# Patient Record
Sex: FEMALE | Race: WHITE | NOT HISPANIC OR LATINO | URBAN - METROPOLITAN AREA
[De-identification: names, ages, dates, MRNs, and addresses within clinical notes are randomized per-mention and may not be internally consistent; named-entity substitution may affect disease eponyms.]

---

## 2017-02-15 ENCOUNTER — OFFICE (AMBULATORY)
Dept: URBAN - METROPOLITAN AREA CLINIC 64 | Facility: CLINIC | Age: 57
End: 2017-02-15

## 2017-02-15 VITALS
SYSTOLIC BLOOD PRESSURE: 135 MMHG | HEIGHT: 62 IN | HEART RATE: 78 BPM | WEIGHT: 153 LBS | DIASTOLIC BLOOD PRESSURE: 86 MMHG

## 2017-02-15 DIAGNOSIS — R10.32 LEFT LOWER QUADRANT PAIN: ICD-10-CM

## 2017-02-15 PROCEDURE — 99203 OFFICE O/P NEW LOW 30 MIN: CPT | Performed by: INTERNAL MEDICINE

## 2017-03-22 ENCOUNTER — HOSPITAL ENCOUNTER (OUTPATIENT)
Dept: MAMMOGRAPHY | Facility: HOSPITAL | Age: 57
Discharge: HOME | End: 2017-03-22
Attending: INTERNAL MEDICINE | Admitting: INTERNAL MEDICINE

## 2017-03-22 ENCOUNTER — OFFICE (AMBULATORY)
Dept: URBAN - METROPOLITAN AREA CLINIC 64 | Facility: CLINIC | Age: 57
End: 2017-03-22

## 2017-03-22 VITALS
HEIGHT: 62 IN | DIASTOLIC BLOOD PRESSURE: 86 MMHG | WEIGHT: 152 LBS | SYSTOLIC BLOOD PRESSURE: 113 MMHG | HEART RATE: 99 BPM

## 2017-03-22 DIAGNOSIS — R10.32 LEFT LOWER QUADRANT PAIN: ICD-10-CM

## 2017-03-22 DIAGNOSIS — R93.3 ABNORMAL FINDINGS ON DIAGNOSTIC IMAGING OF OTHER PARTS OF DI: ICD-10-CM

## 2017-03-22 DIAGNOSIS — K57.90 DIVERTICULOSIS OF INTESTINE, PART UNSPECIFIED, WITHOUT PERFO: ICD-10-CM

## 2017-03-22 PROCEDURE — 99214 OFFICE O/P EST MOD 30 MIN: CPT | Performed by: INTERNAL MEDICINE

## 2017-04-07 ENCOUNTER — ON CAMPUS - OUTPATIENT (AMBULATORY)
Dept: URBAN - METROPOLITAN AREA HOSPITAL 2 | Facility: HOSPITAL | Age: 57
End: 2017-04-07

## 2017-04-07 ENCOUNTER — OFFICE (AMBULATORY)
Dept: URBAN - METROPOLITAN AREA CLINIC 64 | Facility: CLINIC | Age: 57
End: 2017-04-07
Payer: COMMERCIAL

## 2017-04-07 VITALS
HEART RATE: 78 BPM | WEIGHT: 149.6 LBS | HEART RATE: 105 BPM | DIASTOLIC BLOOD PRESSURE: 93 MMHG | DIASTOLIC BLOOD PRESSURE: 91 MMHG | OXYGEN SATURATION: 95 % | SYSTOLIC BLOOD PRESSURE: 122 MMHG | OXYGEN SATURATION: 79 % | HEART RATE: 96 BPM | RESPIRATION RATE: 17 BRPM | DIASTOLIC BLOOD PRESSURE: 49 MMHG | TEMPERATURE: 97.9 F | SYSTOLIC BLOOD PRESSURE: 111 MMHG | HEART RATE: 95 BPM | DIASTOLIC BLOOD PRESSURE: 86 MMHG | SYSTOLIC BLOOD PRESSURE: 137 MMHG | DIASTOLIC BLOOD PRESSURE: 81 MMHG | SYSTOLIC BLOOD PRESSURE: 149 MMHG | HEART RATE: 103 BPM | OXYGEN SATURATION: 97 % | DIASTOLIC BLOOD PRESSURE: 96 MMHG | OXYGEN SATURATION: 89 % | SYSTOLIC BLOOD PRESSURE: 135 MMHG | SYSTOLIC BLOOD PRESSURE: 113 MMHG | DIASTOLIC BLOOD PRESSURE: 98 MMHG | RESPIRATION RATE: 15 BRPM | SYSTOLIC BLOOD PRESSURE: 106 MMHG | OXYGEN SATURATION: 100 % | OXYGEN SATURATION: 98 % | RESPIRATION RATE: 18 BRPM | HEART RATE: 84 BPM | DIASTOLIC BLOOD PRESSURE: 90 MMHG | HEIGHT: 62 IN | DIASTOLIC BLOOD PRESSURE: 94 MMHG | SYSTOLIC BLOOD PRESSURE: 108 MMHG | OXYGEN SATURATION: 99 % | OXYGEN SATURATION: 87 % | HEART RATE: 102 BPM | SYSTOLIC BLOOD PRESSURE: 121 MMHG | RESPIRATION RATE: 16 BRPM | TEMPERATURE: 98.3 F | HEART RATE: 91 BPM | DIASTOLIC BLOOD PRESSURE: 59 MMHG

## 2017-04-07 DIAGNOSIS — D12.2 BENIGN NEOPLASM OF ASCENDING COLON: ICD-10-CM

## 2017-04-07 DIAGNOSIS — R93.3 ABNORMAL FINDINGS ON DIAGNOSTIC IMAGING OF OTHER PARTS OF DI: ICD-10-CM

## 2017-04-07 DIAGNOSIS — K57.30 DIVERTICULOSIS OF LARGE INTESTINE WITHOUT PERFORATION OR ABS: ICD-10-CM

## 2017-04-07 DIAGNOSIS — K64.1 SECOND DEGREE HEMORRHOIDS: ICD-10-CM

## 2017-04-07 LAB
GI HISTOLOGY: A. UNSPECIFIED: (no result)
GI HISTOLOGY: PDF REPORT: (no result)

## 2017-04-07 PROCEDURE — 88305 TISSUE EXAM BY PATHOLOGIST: CPT | Performed by: INTERNAL MEDICINE

## 2017-04-07 PROCEDURE — 45385 COLONOSCOPY W/LESION REMOVAL: CPT | Performed by: INTERNAL MEDICINE

## 2017-04-07 RX ADMIN — PROPOFOL: 10 INJECTION, EMULSION INTRAVENOUS at 16:19

## 2019-07-02 ENCOUNTER — TELEPHONE (OUTPATIENT)
Dept: FAMILY MEDICINE CLINIC | Facility: CLINIC | Age: 59
End: 2019-07-02

## 2019-07-02 DIAGNOSIS — Z00.00 PREVENTATIVE HEALTH CARE: Primary | ICD-10-CM

## 2019-07-02 DIAGNOSIS — E03.9 ACQUIRED HYPOTHYROIDISM: ICD-10-CM

## 2019-07-02 DIAGNOSIS — R03.0 ELEVATED BLOOD PRESSURE READING WITHOUT DIAGNOSIS OF HYPERTENSION: ICD-10-CM

## 2019-07-02 DIAGNOSIS — Z13.220 LIPID SCREENING: ICD-10-CM

## 2019-07-02 PROBLEM — R00.2 PALPITATIONS: Status: ACTIVE | Noted: 2018-12-12

## 2019-07-02 PROBLEM — N39.0 URINARY TRACT INFECTION: Status: ACTIVE | Noted: 2018-12-12

## 2019-07-02 PROBLEM — J30.9 ALLERGIC RHINITIS: Status: ACTIVE | Noted: 2018-12-12

## 2019-07-02 PROBLEM — Z12.31 OTHER SCREENING MAMMOGRAM: Status: ACTIVE | Noted: 2017-01-23

## 2019-07-02 RX ORDER — CHLORTHALIDONE 25 MG/1
12.5 TABLET ORAL EVERY MORNING
Qty: 45 TABLET | Refills: 0 | Status: SHIPPED | OUTPATIENT
Start: 2019-07-02 | End: 2019-12-05 | Stop reason: SDUPTHER

## 2019-07-02 RX ORDER — LEVOTHYROXINE SODIUM 0.07 MG/1
1 TABLET ORAL DAILY
Refills: 5 | COMMUNITY
Start: 2019-04-05 | End: 2019-07-02 | Stop reason: SDUPTHER

## 2019-07-02 RX ORDER — LEVOTHYROXINE SODIUM 0.07 MG/1
75 TABLET ORAL DAILY
Qty: 90 TABLET | Refills: 0 | Status: SHIPPED | OUTPATIENT
Start: 2019-07-02 | End: 2019-09-25 | Stop reason: SDUPTHER

## 2019-07-02 RX ORDER — ZOLMITRIPTAN 5 MG/1
TABLET, FILM COATED ORAL
Qty: 36 TABLET | Refills: 0 | Status: SHIPPED | OUTPATIENT
Start: 2019-07-02 | End: 2019-12-05 | Stop reason: SDUPTHER

## 2019-07-02 RX ORDER — ZOLMITRIPTAN 5 MG/1
TABLET, FILM COATED ORAL
COMMUNITY
Start: 2017-10-17 | End: 2019-07-02 | Stop reason: SDUPTHER

## 2019-07-02 RX ORDER — CHLORTHALIDONE 25 MG/1
0.5 TABLET ORAL EVERY MORNING
Refills: 1 | COMMUNITY
Start: 2019-06-16 | End: 2019-07-02 | Stop reason: SDUPTHER

## 2019-07-03 ENCOUNTER — TELEPHONE (OUTPATIENT)
Dept: FAMILY MEDICINE CLINIC | Facility: CLINIC | Age: 59
End: 2019-07-03

## 2019-07-03 LAB
ALBUMIN SERPL-MCNC: 4.1 G/DL (ref 3.5–5.5)
ALBUMIN/GLOB SERPL: 1.4 {RATIO} (ref 1.2–2.2)
ALP SERPL-CCNC: 97 IU/L (ref 39–117)
ALT SERPL-CCNC: 10 IU/L (ref 0–32)
AST SERPL-CCNC: 11 IU/L (ref 0–40)
BILIRUB SERPL-MCNC: 0.3 MG/DL (ref 0–1.2)
BUN SERPL-MCNC: 8 MG/DL (ref 6–24)
BUN/CREAT SERPL: 11 (ref 9–23)
CALCIUM SERPL-MCNC: 9.7 MG/DL (ref 8.7–10.2)
CHLORIDE SERPL-SCNC: 100 MMOL/L (ref 96–106)
CHOLEST SERPL-MCNC: 162 MG/DL (ref 100–199)
CO2 SERPL-SCNC: 28 MMOL/L (ref 20–29)
CREAT SERPL-MCNC: 0.71 MG/DL (ref 0.57–1)
ERYTHROCYTE [DISTWIDTH] IN BLOOD BY AUTOMATED COUNT: 13 % (ref 12.3–15.4)
GLOBULIN SER CALC-MCNC: 3 G/DL (ref 1.5–4.5)
GLUCOSE SERPL-MCNC: 95 MG/DL (ref 65–99)
HCT VFR BLD AUTO: 45.1 % (ref 34–46.6)
HDLC SERPL-MCNC: 64 MG/DL
HGB BLD-MCNC: 14.3 G/DL (ref 11.1–15.9)
LDLC SERPL CALC-MCNC: 82 MG/DL (ref 0–99)
MCH RBC QN AUTO: 29.7 PG (ref 26.6–33)
MCHC RBC AUTO-ENTMCNC: 31.7 G/DL (ref 31.5–35.7)
MCV RBC AUTO: 94 FL (ref 79–97)
PLATELET # BLD AUTO: 335 X10E3/UL (ref 150–450)
POTASSIUM SERPL-SCNC: 4.1 MMOL/L (ref 3.5–5.2)
PROT SERPL-MCNC: 7.1 G/DL (ref 6–8.5)
RBC # BLD AUTO: 4.81 X10E6/UL (ref 3.77–5.28)
SODIUM SERPL-SCNC: 141 MMOL/L (ref 134–144)
T3 SERPL-MCNC: 121 NG/DL (ref 71–180)
T4 FREE SERPL-MCNC: 1.54 NG/DL (ref 0.82–1.77)
TRIGL SERPL-MCNC: 80 MG/DL (ref 0–149)
TSH SERPL DL<=0.005 MIU/L-ACNC: 2.4 UIU/ML (ref 0.45–4.5)
VLDLC SERPL CALC-MCNC: 16 MG/DL (ref 5–40)
WBC # BLD AUTO: 7.3 X10E3/UL (ref 3.4–10.8)

## 2019-09-25 RX ORDER — LEVOTHYROXINE SODIUM 0.07 MG/1
75 TABLET ORAL DAILY
Qty: 90 TABLET | Refills: 0 | Status: SHIPPED | OUTPATIENT
Start: 2019-09-25 | End: 2019-12-05 | Stop reason: SDUPTHER

## 2019-10-17 RX ORDER — ESTRADIOL 1 MG/1
1 TABLET ORAL DAILY
Qty: 30 TABLET | Refills: 1 | Status: SHIPPED | OUTPATIENT
Start: 2019-10-17 | End: 2019-10-31 | Stop reason: SDUPTHER

## 2019-10-17 RX ORDER — ESTRADIOL 1 MG/1
1 TABLET ORAL DAILY
COMMUNITY
Start: 2017-08-25 | End: 2019-10-17 | Stop reason: SDUPTHER

## 2019-10-31 RX ORDER — ESTRADIOL 1 MG/1
1 TABLET ORAL DAILY
Qty: 90 TABLET | Refills: 0 | Status: SHIPPED | OUTPATIENT
Start: 2019-10-31 | End: 2019-12-05 | Stop reason: SDUPTHER

## 2019-11-23 RX ORDER — CHLORTHALIDONE 25 MG/1
TABLET ORAL
Qty: 45 TABLET | Refills: 0 | OUTPATIENT
Start: 2019-11-23

## 2019-12-05 ENCOUNTER — OFFICE VISIT (OUTPATIENT)
Dept: FAMILY MEDICINE CLINIC | Facility: CLINIC | Age: 59
End: 2019-12-05

## 2019-12-05 VITALS
OXYGEN SATURATION: 99 % | WEIGHT: 146.6 LBS | DIASTOLIC BLOOD PRESSURE: 84 MMHG | SYSTOLIC BLOOD PRESSURE: 126 MMHG | HEIGHT: 62 IN | BODY MASS INDEX: 26.98 KG/M2 | HEART RATE: 65 BPM | TEMPERATURE: 97.6 F

## 2019-12-05 DIAGNOSIS — L81.9 DISCOLORATION OF SKIN OF FOOT: ICD-10-CM

## 2019-12-05 DIAGNOSIS — M79.89 SWELLING OF EXTREMITY: ICD-10-CM

## 2019-12-05 DIAGNOSIS — E03.9 ACQUIRED HYPOTHYROIDISM: Primary | ICD-10-CM

## 2019-12-05 DIAGNOSIS — IMO0002 BODY MASS INDEX (BMI) OF 25.0 TO 29.9: ICD-10-CM

## 2019-12-05 DIAGNOSIS — Z00.00 PREVENTATIVE HEALTH CARE: ICD-10-CM

## 2019-12-05 DIAGNOSIS — Z13.220 LIPID SCREENING: ICD-10-CM

## 2019-12-05 DIAGNOSIS — I99.9 CIRCULATION PROBLEM: ICD-10-CM

## 2019-12-05 DIAGNOSIS — E53.8 VITAMIN B 12 DEFICIENCY: ICD-10-CM

## 2019-12-05 DIAGNOSIS — M79.89 LEG SWELLING: ICD-10-CM

## 2019-12-05 PROBLEM — M17.11 OSTEOARTHRITIS OF RIGHT KNEE: Status: ACTIVE | Noted: 2019-12-05

## 2019-12-05 PROBLEM — G25.81 RLS (RESTLESS LEGS SYNDROME): Status: ACTIVE | Noted: 2019-12-05

## 2019-12-05 PROBLEM — J30.89 PERENNIAL ALLERGIC RHINITIS WITH SEASONAL VARIATION: Status: ACTIVE | Noted: 2019-01-10

## 2019-12-05 PROBLEM — H65.90 SEROUS OTITIS MEDIA: Status: ACTIVE | Noted: 2017-02-16

## 2019-12-05 PROBLEM — H61.21 IMPACTED CERUMEN OF RIGHT EAR: Status: ACTIVE | Noted: 2017-02-19

## 2019-12-05 PROBLEM — J32.9 CHRONIC SINUSITIS: Status: ACTIVE | Noted: 2017-02-16

## 2019-12-05 PROBLEM — J30.2 PERENNIAL ALLERGIC RHINITIS WITH SEASONAL VARIATION: Status: ACTIVE | Noted: 2019-01-10

## 2019-12-05 PROBLEM — R12 HEART BURN: Status: ACTIVE | Noted: 2019-12-05

## 2019-12-05 PROBLEM — H93.13 BILATERAL TINNITUS: Status: ACTIVE | Noted: 2019-02-06

## 2019-12-05 PROCEDURE — 99214 OFFICE O/P EST MOD 30 MIN: CPT | Performed by: NURSE PRACTITIONER

## 2019-12-05 RX ORDER — ESTRADIOL 1 MG/1
1 TABLET ORAL DAILY
Qty: 90 TABLET | Refills: 0 | Status: SHIPPED | OUTPATIENT
Start: 2019-12-05 | End: 2020-03-03

## 2019-12-05 RX ORDER — CHLORTHALIDONE 25 MG/1
12.5 TABLET ORAL EVERY MORNING
Qty: 45 TABLET | Refills: 0 | Status: SHIPPED | OUTPATIENT
Start: 2019-12-05 | End: 2020-03-02

## 2019-12-05 RX ORDER — LEVOTHYROXINE SODIUM 0.07 MG/1
75 TABLET ORAL DAILY
Qty: 90 TABLET | Refills: 0 | Status: SHIPPED | OUTPATIENT
Start: 2019-12-05 | End: 2020-03-02

## 2019-12-05 RX ORDER — ZOLMITRIPTAN 5 MG/1
TABLET, FILM COATED ORAL
Qty: 36 TABLET | Refills: 0 | Status: SHIPPED | OUTPATIENT
Start: 2019-12-05 | End: 2020-03-23

## 2019-12-05 NOTE — PATIENT INSTRUCTIONS
Fasting blood work today   bilateral venous Doppler study   get vascular studies done   schedule eye exam

## 2019-12-05 NOTE — PROGRESS NOTES
Subjective   Marion Ro is a 59 y.o. female.      59-year-old white female with history of hypothyroidism, arthritis, hysterectomy who comes in today for follow-up visit and fasting blood work.  Patient's only complaint is that in the past year her feet have been hurting reoccurring blue especially when sitting and she has had some pain in her legs with standing.  She does have some mild Crohn's veins in her legs and I am getting a venous Doppler study.  I also gave her information to get vascular studies done  Open blood pressure 126/84 heart rate 64 with murmur she denies any chest pain, dyspnea, tachycardia, dizziness or edema  Weight is  Down 8 lb at 147   her colonoscopy is due in 2020 she is getting ready to schedule an eye exam and she is up-to-date on mammograms and flu shots     fasting blood work   schedule eye exam   venous Doppler studies bilaterally   preventative vascular studies         The following portions of the patient's history were reviewed and updated as appropriate: allergies, current medications, past family history, past medical history, past social history, past surgical history and problem list.    Review of Systems   Constitutional: Negative.    HENT: Negative.    Respiratory: Negative.    Cardiovascular: Negative.         Feet turning red and blue and leg pain with standing   Gastrointestinal: Negative.    Genitourinary: Negative.    Musculoskeletal: Negative.    Skin: Negative.    Neurological: Negative.    Psychiatric/Behavioral: Negative.        Objective   Physical Exam   Constitutional: She appears well-developed and well-nourished.   Cardiovascular: Normal rate and regular rhythm.   Pulmonary/Chest: Effort normal and breath sounds normal.   Abdominal: Soft. Bowel sounds are normal.   Musculoskeletal: Normal range of motion.   Skin: Skin is dry.   Feet turning red and blue and becoming cold when patient sits with right foot being worsen lab in legs hurt when she is standing    Psychiatric: She has a normal mood and affect.         Assessment/Plan   Marion was seen today for hypothyroidism.    Diagnoses and all orders for this visit:    Acquired hypothyroidism  -     TSH+Free T4  -     T3    Lipid screening    Preventative health care  -     CBC & Differential  -     Comprehensive Metabolic Panel  -     Lipid Panel With LDL / HDL Ratio    Vitamin B 12 deficiency  -     Vitamin B12    Discoloration of skin of foot  -     Cancel: Duplex Venous Lower Extremity - Bilateral CAR; Future  -     Duplex Venous Lower Extremity - Bilateral CAR; Future    Circulation problem  -     Duplex Venous Lower Extremity - Bilateral CAR; Future    Leg swelling  -     Duplex Venous Lower Extremity - Bilateral CAR; Future    Body mass index (BMI) of 25.0 to 29.9    Swelling of extremity    Other orders  -     ZOLMitriptan (ZOMIG) 5 MG tablet; Take 1 tablet by mouth at the onset of headache.  May repeat in 2 hrs if needed.  Max of 2 per day.  -     levothyroxine (SYNTHROID, LEVOTHROID) 75 MCG tablet; Take 1 tablet by mouth Daily.  -     estradiol (ESTRACE) 1 MG tablet; Take 1 tablet by mouth Daily.  -     chlorthalidone (HYGROTON) 25 MG tablet; Take 0.5 tablets by mouth Every Morning.

## 2019-12-06 LAB
ALBUMIN SERPL-MCNC: 4.2 G/DL (ref 3.5–5.5)
ALBUMIN/GLOB SERPL: 1.4 {RATIO} (ref 1.2–2.2)
ALP SERPL-CCNC: 96 IU/L (ref 39–117)
ALT SERPL-CCNC: 15 IU/L (ref 0–32)
AST SERPL-CCNC: 13 IU/L (ref 0–40)
BASOPHILS # BLD AUTO: 0 X10E3/UL (ref 0–0.2)
BASOPHILS NFR BLD AUTO: 1 %
BILIRUB SERPL-MCNC: 0.2 MG/DL (ref 0–1.2)
BUN SERPL-MCNC: 11 MG/DL (ref 6–24)
BUN/CREAT SERPL: 16 (ref 9–23)
CALCIUM SERPL-MCNC: 9.2 MG/DL (ref 8.7–10.2)
CHLORIDE SERPL-SCNC: 99 MMOL/L (ref 96–106)
CHOLEST SERPL-MCNC: 162 MG/DL (ref 100–199)
CO2 SERPL-SCNC: 24 MMOL/L (ref 20–29)
CREAT SERPL-MCNC: 0.69 MG/DL (ref 0.57–1)
EOSINOPHIL # BLD AUTO: 0.1 X10E3/UL (ref 0–0.4)
EOSINOPHIL NFR BLD AUTO: 2 %
ERYTHROCYTE [DISTWIDTH] IN BLOOD BY AUTOMATED COUNT: 12.5 % (ref 12.3–15.4)
GLOBULIN SER CALC-MCNC: 3 G/DL (ref 1.5–4.5)
GLUCOSE SERPL-MCNC: 85 MG/DL (ref 65–99)
HCT VFR BLD AUTO: 43.9 % (ref 34–46.6)
HDLC SERPL-MCNC: 62 MG/DL
HGB BLD-MCNC: 14.1 G/DL (ref 11.1–15.9)
IMM GRANULOCYTES # BLD AUTO: 0 X10E3/UL (ref 0–0.1)
IMM GRANULOCYTES NFR BLD AUTO: 0 %
LDLC SERPL CALC-MCNC: 83 MG/DL (ref 0–99)
LDLC/HDLC SERPL: 1.3 RATIO (ref 0–3.2)
LYMPHOCYTES # BLD AUTO: 1.5 X10E3/UL (ref 0.7–3.1)
LYMPHOCYTES NFR BLD AUTO: 19 %
MCH RBC QN AUTO: 29.9 PG (ref 26.6–33)
MCHC RBC AUTO-ENTMCNC: 32.1 G/DL (ref 31.5–35.7)
MCV RBC AUTO: 93 FL (ref 79–97)
MONOCYTES # BLD AUTO: 0.7 X10E3/UL (ref 0.1–0.9)
MONOCYTES NFR BLD AUTO: 9 %
NEUTROPHILS # BLD AUTO: 5.5 X10E3/UL (ref 1.4–7)
NEUTROPHILS NFR BLD AUTO: 69 %
PLATELET # BLD AUTO: 330 X10E3/UL (ref 150–450)
POTASSIUM SERPL-SCNC: 4 MMOL/L (ref 3.5–5.2)
PROT SERPL-MCNC: 7.2 G/DL (ref 6–8.5)
RBC # BLD AUTO: 4.71 X10E6/UL (ref 3.77–5.28)
SODIUM SERPL-SCNC: 139 MMOL/L (ref 134–144)
T3 SERPL-MCNC: 125 NG/DL (ref 71–180)
T4 FREE SERPL-MCNC: 1.41 NG/DL (ref 0.82–1.77)
TRIGL SERPL-MCNC: 84 MG/DL (ref 0–149)
TSH SERPL DL<=0.005 MIU/L-ACNC: 2.43 UIU/ML (ref 0.45–4.5)
VIT B12 SERPL-MCNC: 1669 PG/ML (ref 232–1245)
VLDLC SERPL CALC-MCNC: 17 MG/DL (ref 5–40)
WBC # BLD AUTO: 7.9 X10E3/UL (ref 3.4–10.8)

## 2019-12-11 ENCOUNTER — TRANSCRIBE ORDERS (OUTPATIENT)
Dept: ADMINISTRATIVE | Facility: HOSPITAL | Age: 59
End: 2019-12-11

## 2019-12-11 DIAGNOSIS — Z13.6 ENCOUNTER FOR SCREENING FOR VASCULAR DISEASE: Primary | ICD-10-CM

## 2019-12-18 DIAGNOSIS — M79.89 LEG SWELLING: ICD-10-CM

## 2019-12-18 DIAGNOSIS — I99.9 CIRCULATION PROBLEM: ICD-10-CM

## 2019-12-18 DIAGNOSIS — L81.9 DISCOLORATION OF SKIN OF FOOT: ICD-10-CM

## 2019-12-27 ENCOUNTER — HOSPITAL ENCOUNTER (OUTPATIENT)
Dept: CARDIOLOGY | Facility: HOSPITAL | Age: 59
Discharge: HOME | End: 2019-12-27
Admitting: NURSE PRACTITIONER

## 2019-12-27 DIAGNOSIS — Z13.6 ENCOUNTER FOR SCREENING FOR VASCULAR DISEASE: ICD-10-CM

## 2019-12-27 LAB
BH CV XLRA MEAS - MID AO DIAM: 1.46 CM
BH CV XLRA MEAS - PAD LEFT ABI PT: 1.2
BH CV XLRA MEAS - PAD LEFT ARM: 127 MMHG
BH CV XLRA MEAS - PAD LEFT LEG PT: 153 MMHG
BH CV XLRA MEAS - PAD RIGHT ABI PT: 1.27
BH CV XLRA MEAS - PAD RIGHT ARM: 128 MMHG
BH CV XLRA MEAS - PAD RIGHT LEG PT: 163 MMHG
BH CV XLRA MEAS LEFT CCA RATIO VEL: -76.2 CM/SEC
BH CV XLRA MEAS LEFT ICA RATIO VEL: -78.6 CM/SEC
BH CV XLRA MEAS LEFT ICA/CCA RATIO: 1
BH CV XLRA MEAS RIGHT CCA RATIO VEL: -97.5 CM/SEC
BH CV XLRA MEAS RIGHT ICA RATIO VEL: -84.5 CM/SEC
BH CV XLRA MEAS RIGHT ICA/CCA RATIO: 0.87

## 2019-12-27 PROCEDURE — 93799 UNLISTED CV SVC/PROCEDURE: CPT

## 2020-02-04 ENCOUNTER — TELEPHONE (OUTPATIENT)
Dept: FAMILY MEDICINE CLINIC | Facility: CLINIC | Age: 60
End: 2020-02-04

## 2020-02-04 RX ORDER — OSELTAMIVIR PHOSPHATE 75 MG/1
75 CAPSULE ORAL 2 TIMES DAILY
Qty: 10 CAPSULE | Refills: 0 | Status: SHIPPED | OUTPATIENT
Start: 2020-02-04 | End: 2020-05-29

## 2020-02-04 NOTE — TELEPHONE ENCOUNTER
Pt called and said her grandson just tested positve for Flu A and wants to know if you could call her in Tamiflu.  SG

## 2020-02-27 ENCOUNTER — APPOINTMENT (OUTPATIENT)
Dept: CARDIOLOGY | Facility: HOSPITAL | Age: 60
End: 2020-02-27

## 2020-03-02 RX ORDER — LEVOTHYROXINE SODIUM 0.07 MG/1
TABLET ORAL
Qty: 90 TABLET | Refills: 0 | Status: SHIPPED | OUTPATIENT
Start: 2020-03-02 | End: 2020-05-28 | Stop reason: SDUPTHER

## 2020-03-02 RX ORDER — CHLORTHALIDONE 25 MG/1
TABLET ORAL
Qty: 45 TABLET | Refills: 0 | Status: SHIPPED | OUTPATIENT
Start: 2020-03-02 | End: 2020-05-28 | Stop reason: SDUPTHER

## 2020-03-03 RX ORDER — ESTRADIOL 1 MG/1
TABLET ORAL
Qty: 90 TABLET | Refills: 0 | Status: SHIPPED | OUTPATIENT
Start: 2020-03-03 | End: 2020-05-29 | Stop reason: SDUPTHER

## 2020-03-23 RX ORDER — ZOLMITRIPTAN 5 MG/1
TABLET, FILM COATED ORAL
Qty: 36 TABLET | Refills: 0 | Status: SHIPPED | OUTPATIENT
Start: 2020-03-23 | End: 2020-09-08

## 2020-05-26 RX ORDER — LEVOTHYROXINE SODIUM 0.07 MG/1
TABLET ORAL
Qty: 90 TABLET | Refills: 0 | OUTPATIENT
Start: 2020-05-26

## 2020-05-26 RX ORDER — CHLORTHALIDONE 25 MG/1
TABLET ORAL
Qty: 45 TABLET | Refills: 0 | OUTPATIENT
Start: 2020-05-26

## 2020-05-28 RX ORDER — LEVOTHYROXINE SODIUM 0.07 MG/1
75 TABLET ORAL DAILY
Qty: 90 TABLET | Refills: 0 | Status: SHIPPED | OUTPATIENT
Start: 2020-05-28 | End: 2020-08-25

## 2020-05-28 RX ORDER — CHLORTHALIDONE 25 MG/1
12.5 TABLET ORAL EVERY MORNING
Qty: 45 TABLET | Refills: 0 | Status: SHIPPED | OUTPATIENT
Start: 2020-05-28 | End: 2020-05-29 | Stop reason: SDUPTHER

## 2020-05-29 ENCOUNTER — OFFICE VISIT (OUTPATIENT)
Dept: FAMILY MEDICINE CLINIC | Facility: CLINIC | Age: 60
End: 2020-05-29

## 2020-05-29 VITALS
OXYGEN SATURATION: 99 % | HEIGHT: 62 IN | TEMPERATURE: 97.3 F | SYSTOLIC BLOOD PRESSURE: 139 MMHG | DIASTOLIC BLOOD PRESSURE: 86 MMHG | HEART RATE: 75 BPM | WEIGHT: 143 LBS | BODY MASS INDEX: 26.31 KG/M2

## 2020-05-29 DIAGNOSIS — Z13.220 LIPID SCREENING: ICD-10-CM

## 2020-05-29 DIAGNOSIS — E03.9 ACQUIRED HYPOTHYROIDISM: Primary | ICD-10-CM

## 2020-05-29 DIAGNOSIS — Z00.00 PREVENTATIVE HEALTH CARE: ICD-10-CM

## 2020-05-29 DIAGNOSIS — R03.0 ELEVATED BLOOD PRESSURE READING WITHOUT DIAGNOSIS OF HYPERTENSION: ICD-10-CM

## 2020-05-29 DIAGNOSIS — IMO0002 BODY MASS INDEX (BMI) OF 25.0 TO 29.9: ICD-10-CM

## 2020-05-29 PROCEDURE — 99213 OFFICE O/P EST LOW 20 MIN: CPT | Performed by: NURSE PRACTITIONER

## 2020-05-29 RX ORDER — CHLORTHALIDONE 25 MG/1
12.5 TABLET ORAL EVERY MORNING
Qty: 45 TABLET | Refills: 0 | Status: SHIPPED | OUTPATIENT
Start: 2020-05-29 | End: 2020-09-08

## 2020-05-29 RX ORDER — ESTRADIOL 1 MG/1
1 TABLET ORAL DAILY
Qty: 90 TABLET | Refills: 0 | Status: SHIPPED | OUTPATIENT
Start: 2020-05-29 | End: 2020-09-08

## 2020-05-29 NOTE — PROGRESS NOTES
"    Marion Ro is a 59 y.o. female.     59-year-old white female with history of hypothyroidism, arthritis, hysterectomy who comes in today for follow-up visit and fasting blood work.  Patient has actually been doing quite well has no new complaints  Blood pressure 138/86 heart rate 74 she denies any chest pain, dyspnea, tachycardia or dizziness  Weight is down 4 pounds at 143  Patient's colonoscopy is due and she wants to schedule it herself as well as an eye exam.  The last mammogram shows 2017 but she is pretty sure she had one in January 2020 and she is going to check and find out    Fasting blood work  Schedule colonoscopy and eye exam  Find out about last mammogram       The following portions of the patient's history were reviewed and updated as appropriate: allergies, current medications, past family history, past medical history, past social history, past surgical history and problem list.    Vitals:    05/29/20 0845   BP: 139/86   BP Location: Right arm   Patient Position: Sitting   Cuff Size: Adult   Pulse: 75   Temp: 97.3 °F (36.3 °C)   TempSrc: Temporal   SpO2: 99%   Weight: 64.9 kg (143 lb)   Height: 157.5 cm (62\")     Body mass index is 26.16 kg/m².    Past Medical History:   Diagnosis Date   • Hypothyroidism      Past Surgical History:   Procedure Laterality Date   • BILATERAL BREAST REDUCTION     • BONY PELVIS SURGERY     • HYSTERECTOMY     • KNEE ARTHROPLASTY, PARTIAL REPLACEMENT Right    • THYROID CYST EXCISION       Family History   Problem Relation Age of Onset   • Lung cancer Mother      Immunization History   Administered Date(s) Administered   • Influenza, Unspecified 10/05/2019       Hospital Outpatient Visit on 12/27/2019   Component Date Value Ref Range Status   • CCA ratio freedom 12/27/2019 -76.2  cm/sec Final   • CCA ratio freedom 12/27/2019 -97.5  cm/sec Final   • ICA ratio freedom 12/27/2019 -78.6  cm/sec Final   • ICA ratio freedom 12/27/2019 -84.5  cm/sec Final   • ICA/CCA ratio 12/27/2019 " 1.0   Final   • ICA/CCA ratio 12/27/2019 0.87   Final   • Mid Ao Diam 12/27/2019 1.46  cm Final   • PAD Right Arm 12/27/2019 128  mmHg Final   • PAD Right Leg PT 12/27/2019 163  mmHg Final   • PAD Right ALIRIO PT 12/27/2019 1.27   Final   • PAD Left Arm 12/27/2019 127  mmHg Final   • PAD Left Leg PT 12/27/2019 153  mmHg Final   • PAD Left ALIRIO PT 12/27/2019 1.20   Final         Review of Systems   Constitutional: Negative.    HENT: Negative.    Respiratory: Negative.    Cardiovascular: Negative.    Gastrointestinal: Negative.    Genitourinary: Negative.    Musculoskeletal: Negative.    Skin: Negative.    Neurological: Negative.    Psychiatric/Behavioral: Negative.        Objective   Physical Exam   Constitutional: She appears well-developed and well-nourished.   Cardiovascular: Normal rate and regular rhythm.   Pulmonary/Chest: Effort normal and breath sounds normal.   Abdominal: Bowel sounds are normal.   Musculoskeletal: Normal range of motion.   Neurological: She is alert.   Skin: Skin is warm.   Psychiatric: She has a normal mood and affect.       Procedures    Assessment/Plan   Marion was seen today for hypothyroidism.    Diagnoses and all orders for this visit:    Acquired hypothyroidism  -     T3  -     TSH+Free T4    Lipid screening  -     Lipid Panel With LDL / HDL Ratio    Preventative health care  -     Comprehensive Metabolic Panel  -     CBC & Differential    Elevated blood pressure reading without diagnosis of hypertension    Body mass index (BMI) of 25.0 to 29.9    Other orders  -     chlorthalidone (HYGROTON) 25 MG tablet; Take 0.5 tablets by mouth Every Morning.  -     estradiol (ESTRACE) 1 MG tablet; Take 1 tablet by mouth Daily.          Current Outpatient Medications:   •  chlorthalidone (HYGROTON) 25 MG tablet, Take 0.5 tablets by mouth Every Morning., Disp: 45 tablet, Rfl: 0  •  estradiol (ESTRACE) 1 MG tablet, Take 1 tablet by mouth Daily., Disp: 90 tablet, Rfl: 0  •  levothyroxine (SYNTHROID,  LEVOTHROID) 75 MCG tablet, Take 1 tablet by mouth Daily., Disp: 90 tablet, Rfl: 0  •  ZOLMitriptan (ZOMIG) 5 MG tablet, TAKE 1 TABLET BY MOUTH AT THE ONSET OF HEADACHE. MAY REPEAT IN 2 HRS IF NEEDED. MAX OF 2 PER DAY., Disp: 36 tablet, Rfl: 0

## 2020-05-30 LAB
ALBUMIN SERPL-MCNC: 4.1 G/DL (ref 3.8–4.9)
ALBUMIN/GLOB SERPL: 1.6 {RATIO} (ref 1.2–2.2)
ALP SERPL-CCNC: 80 IU/L (ref 39–117)
ALT SERPL-CCNC: 10 IU/L (ref 0–32)
AST SERPL-CCNC: 15 IU/L (ref 0–40)
BASOPHILS # BLD AUTO: 0 X10E3/UL (ref 0–0.2)
BASOPHILS NFR BLD AUTO: 1 %
BILIRUB SERPL-MCNC: 0.3 MG/DL (ref 0–1.2)
BUN SERPL-MCNC: 10 MG/DL (ref 6–24)
BUN/CREAT SERPL: 14 (ref 9–23)
CALCIUM SERPL-MCNC: 9.3 MG/DL (ref 8.7–10.2)
CHLORIDE SERPL-SCNC: 101 MMOL/L (ref 96–106)
CHOLEST SERPL-MCNC: 156 MG/DL (ref 100–199)
CO2 SERPL-SCNC: 27 MMOL/L (ref 20–29)
CREAT SERPL-MCNC: 0.7 MG/DL (ref 0.57–1)
EOSINOPHIL # BLD AUTO: 0.1 X10E3/UL (ref 0–0.4)
EOSINOPHIL NFR BLD AUTO: 2 %
ERYTHROCYTE [DISTWIDTH] IN BLOOD BY AUTOMATED COUNT: 12.3 % (ref 11.7–15.4)
GLOBULIN SER CALC-MCNC: 2.6 G/DL (ref 1.5–4.5)
GLUCOSE SERPL-MCNC: 90 MG/DL (ref 65–99)
HCT VFR BLD AUTO: 43.4 % (ref 34–46.6)
HDLC SERPL-MCNC: 57 MG/DL
HGB BLD-MCNC: 14.1 G/DL (ref 11.1–15.9)
IMM GRANULOCYTES # BLD AUTO: 0 X10E3/UL (ref 0–0.1)
IMM GRANULOCYTES NFR BLD AUTO: 0 %
LDLC SERPL CALC-MCNC: 82 MG/DL (ref 0–99)
LDLC/HDLC SERPL: 1.4 RATIO (ref 0–3.2)
LYMPHOCYTES # BLD AUTO: 1.2 X10E3/UL (ref 0.7–3.1)
LYMPHOCYTES NFR BLD AUTO: 21 %
MCH RBC QN AUTO: 30.4 PG (ref 26.6–33)
MCHC RBC AUTO-ENTMCNC: 32.5 G/DL (ref 31.5–35.7)
MCV RBC AUTO: 94 FL (ref 79–97)
MONOCYTES # BLD AUTO: 0.6 X10E3/UL (ref 0.1–0.9)
MONOCYTES NFR BLD AUTO: 11 %
NEUTROPHILS # BLD AUTO: 3.9 X10E3/UL (ref 1.4–7)
NEUTROPHILS NFR BLD AUTO: 65 %
PLATELET # BLD AUTO: 296 X10E3/UL (ref 150–450)
POTASSIUM SERPL-SCNC: 3.7 MMOL/L (ref 3.5–5.2)
PROT SERPL-MCNC: 6.7 G/DL (ref 6–8.5)
RBC # BLD AUTO: 4.64 X10E6/UL (ref 3.77–5.28)
SODIUM SERPL-SCNC: 142 MMOL/L (ref 134–144)
T3 SERPL-MCNC: 108 NG/DL (ref 71–180)
T4 FREE SERPL-MCNC: 1.29 NG/DL (ref 0.82–1.77)
TRIGL SERPL-MCNC: 87 MG/DL (ref 0–149)
TSH SERPL DL<=0.005 MIU/L-ACNC: 2.51 UIU/ML (ref 0.45–4.5)
VLDLC SERPL CALC-MCNC: 17 MG/DL (ref 5–40)
WBC # BLD AUTO: 6 X10E3/UL (ref 3.4–10.8)

## 2020-06-02 DIAGNOSIS — Z12.31 OTHER SCREENING MAMMOGRAM: Primary | ICD-10-CM

## 2020-08-25 RX ORDER — LEVOTHYROXINE SODIUM 0.07 MG/1
TABLET ORAL
Qty: 90 TABLET | Refills: 0 | Status: SHIPPED | OUTPATIENT
Start: 2020-08-25 | End: 2020-09-08

## 2020-09-08 RX ORDER — CHLORTHALIDONE 25 MG/1
TABLET ORAL
Qty: 45 TABLET | Refills: 0 | Status: SHIPPED | OUTPATIENT
Start: 2020-09-08 | End: 2020-12-21

## 2020-09-08 RX ORDER — ESTRADIOL 1 MG/1
TABLET ORAL
Qty: 90 TABLET | Refills: 0 | Status: SHIPPED | OUTPATIENT
Start: 2020-09-08 | End: 2020-12-19

## 2020-09-08 RX ORDER — LEVOTHYROXINE SODIUM 0.07 MG/1
TABLET ORAL
Qty: 90 TABLET | Refills: 0 | Status: SHIPPED | OUTPATIENT
Start: 2020-09-08 | End: 2021-03-16

## 2020-09-08 RX ORDER — ZOLMITRIPTAN 5 MG/1
TABLET, FILM COATED ORAL
Qty: 36 TABLET | Refills: 0 | Status: SHIPPED | OUTPATIENT
Start: 2020-09-08 | End: 2020-12-02 | Stop reason: SDUPTHER

## 2020-11-10 DIAGNOSIS — Z12.31 OTHER SCREENING MAMMOGRAM: ICD-10-CM

## 2020-11-19 DIAGNOSIS — R92.8 ABNORMAL MAMMOGRAM OF LEFT BREAST: Primary | ICD-10-CM

## 2020-11-30 DIAGNOSIS — R92.8 ABNORMAL MAMMOGRAM OF LEFT BREAST: ICD-10-CM

## 2020-12-02 DIAGNOSIS — R92.8 ABNORMAL MAMMOGRAM OF LEFT BREAST: Primary | ICD-10-CM

## 2020-12-02 DIAGNOSIS — N63.20 MASS OF LEFT BREAST: ICD-10-CM

## 2020-12-02 RX ORDER — ZOLMITRIPTAN 5 MG/1
5 TABLET, FILM COATED ORAL ONCE AS NEEDED
Qty: 36 TABLET | Refills: 1 | Status: SHIPPED | OUTPATIENT
Start: 2020-12-02 | End: 2021-05-05 | Stop reason: SDUPTHER

## 2020-12-10 ENCOUNTER — LAB REQUISITION (OUTPATIENT)
Dept: LAB | Facility: HOSPITAL | Age: 60
End: 2020-12-10

## 2020-12-10 DIAGNOSIS — R92.8 ABNORMAL MAMMOGRAM OF LEFT BREAST: ICD-10-CM

## 2020-12-10 DIAGNOSIS — Z00.00 ENCOUNTER FOR GENERAL ADULT MEDICAL EXAMINATION WITHOUT ABNORMAL FINDINGS: ICD-10-CM

## 2020-12-10 DIAGNOSIS — N63.20 MASS OF LEFT BREAST: ICD-10-CM

## 2020-12-10 PROCEDURE — 88305 TISSUE EXAM BY PATHOLOGIST: CPT | Performed by: RADIOLOGY

## 2020-12-11 LAB
LAB AP CASE REPORT: NORMAL
PATH REPORT.FINAL DX SPEC: NORMAL
PATH REPORT.GROSS SPEC: NORMAL

## 2020-12-19 RX ORDER — ESTRADIOL 1 MG/1
TABLET ORAL
Qty: 90 TABLET | Refills: 0 | Status: SHIPPED | OUTPATIENT
Start: 2020-12-19 | End: 2021-03-04

## 2020-12-21 RX ORDER — CHLORTHALIDONE 25 MG/1
TABLET ORAL
Qty: 15 TABLET | Refills: 0 | Status: SHIPPED | OUTPATIENT
Start: 2020-12-21 | End: 2021-03-29

## 2021-03-04 RX ORDER — ESTRADIOL 1 MG/1
TABLET ORAL
Qty: 30 TABLET | Refills: 0 | Status: SHIPPED | OUTPATIENT
Start: 2021-03-04 | End: 2021-03-21

## 2021-03-16 RX ORDER — LEVOTHYROXINE SODIUM 0.07 MG/1
75 TABLET ORAL DAILY
Qty: 30 TABLET | Refills: 0 | Status: SHIPPED | OUTPATIENT
Start: 2021-03-16 | End: 2021-04-07

## 2021-03-21 RX ORDER — ESTRADIOL 1 MG/1
TABLET ORAL
Qty: 30 TABLET | Refills: 0 | Status: SHIPPED | OUTPATIENT
Start: 2021-03-21 | End: 2021-03-23

## 2021-03-21 RX ORDER — ESTRADIOL 1 MG/1
TABLET ORAL
Qty: 90 TABLET | Refills: 0 | Status: SHIPPED | OUTPATIENT
Start: 2021-03-21 | End: 2021-03-21 | Stop reason: SDUPTHER

## 2021-03-23 RX ORDER — ESTRADIOL 1 MG/1
TABLET ORAL
Qty: 30 TABLET | Refills: 0 | Status: SHIPPED | OUTPATIENT
Start: 2021-03-23 | End: 2021-05-05 | Stop reason: SDUPTHER

## 2021-03-29 RX ORDER — CHLORTHALIDONE 25 MG/1
12.5 TABLET ORAL EVERY MORNING
Qty: 15 TABLET | Refills: 0 | Status: SHIPPED | OUTPATIENT
Start: 2021-03-29 | End: 2021-04-30 | Stop reason: SDUPTHER

## 2021-04-07 RX ORDER — LEVOTHYROXINE SODIUM 0.07 MG/1
75 TABLET ORAL DAILY
Qty: 30 TABLET | Refills: 0 | Status: SHIPPED | OUTPATIENT
Start: 2021-04-07 | End: 2021-05-06

## 2021-04-28 RX ORDER — CHLORTHALIDONE 25 MG/1
12.5 TABLET ORAL EVERY MORNING
Qty: 15 TABLET | Refills: 0 | OUTPATIENT
Start: 2021-04-28

## 2021-04-30 RX ORDER — CHLORTHALIDONE 25 MG/1
12.5 TABLET ORAL EVERY MORNING
Qty: 15 TABLET | Refills: 0 | Status: SHIPPED | OUTPATIENT
Start: 2021-04-30 | End: 2021-05-05 | Stop reason: SDUPTHER

## 2021-05-03 RX ORDER — CHLORTHALIDONE 25 MG/1
12.5 TABLET ORAL EVERY MORNING
Qty: 15 TABLET | Refills: 0 | Status: CANCELLED | OUTPATIENT
Start: 2021-05-03

## 2021-05-05 ENCOUNTER — OFFICE VISIT (OUTPATIENT)
Dept: FAMILY MEDICINE CLINIC | Facility: CLINIC | Age: 61
End: 2021-05-05

## 2021-05-05 VITALS
HEIGHT: 62 IN | SYSTOLIC BLOOD PRESSURE: 136 MMHG | TEMPERATURE: 97.5 F | WEIGHT: 143.8 LBS | HEART RATE: 78 BPM | DIASTOLIC BLOOD PRESSURE: 82 MMHG | OXYGEN SATURATION: 99 % | BODY MASS INDEX: 26.46 KG/M2

## 2021-05-05 DIAGNOSIS — Z00.00 PREVENTATIVE HEALTH CARE: ICD-10-CM

## 2021-05-05 DIAGNOSIS — E03.9 ACQUIRED HYPOTHYROIDISM: Primary | ICD-10-CM

## 2021-05-05 PROCEDURE — 99396 PREV VISIT EST AGE 40-64: CPT | Performed by: NURSE PRACTITIONER

## 2021-05-05 RX ORDER — ESTRADIOL 1 MG/1
1 TABLET ORAL DAILY
Qty: 90 TABLET | Refills: 1 | Status: SHIPPED | OUTPATIENT
Start: 2021-05-05 | End: 2021-08-02

## 2021-05-05 RX ORDER — ESTRADIOL 1 MG/1
1 TABLET ORAL DAILY
Qty: 90 TABLET | Refills: 1 | Status: SHIPPED | OUTPATIENT
Start: 2021-05-05 | End: 2021-05-05 | Stop reason: SDUPTHER

## 2021-05-05 RX ORDER — CHLORTHALIDONE 25 MG/1
12.5 TABLET ORAL EVERY MORNING
Qty: 45 TABLET | Refills: 1 | Status: SHIPPED | OUTPATIENT
Start: 2021-05-05 | End: 2021-08-02

## 2021-05-05 RX ORDER — ZOLMITRIPTAN 5 MG/1
5 TABLET, FILM COATED ORAL ONCE AS NEEDED
Qty: 36 TABLET | Refills: 1 | Status: SHIPPED | OUTPATIENT
Start: 2021-05-05 | End: 2021-07-06

## 2021-05-05 NOTE — PROGRESS NOTES
"    Marion Ro is a 60 y.o. female.     60-year-old white female with history of hypothyroidism, arthritis, hysterectomy, headaches and hypertension who comes in today for annual visit  Blood pressure 136/82 heart rate 78 she denies any chest pain, dyspnea, tachycardia or dizziness  Weight is 114 with a BMI 26.3 and patient's weight usually remains pretty stable  Patient up-to-date on mammograms eye exams and her colonoscopy is up-to-date as well.  She is declining the Covid vaccines and does not do Pap smears due to hysterectomy    Schedule fasting blood work  Follow-up 6 months fasting       The following portions of the patient's history were reviewed and updated as appropriate: allergies, current medications, past family history, past medical history, past social history, past surgical history and problem list.    Vitals:    05/05/21 1604   BP: 136/82   BP Location: Right arm   Patient Position: Sitting   Cuff Size: Large Adult   Pulse: 78   Temp: 97.5 °F (36.4 °C)   TempSrc: Temporal   SpO2: 99%   Weight: 65.2 kg (143 lb 12.8 oz)   Height: 157.5 cm (62\")     Body mass index is 26.3 kg/m².    Past Medical History:   Diagnosis Date   • Hypothyroidism      Past Surgical History:   Procedure Laterality Date   • BILATERAL BREAST REDUCTION     • BONY PELVIS SURGERY     • HYSTERECTOMY     • KNEE ARTHROPLASTY, PARTIAL REPLACEMENT Right    • THYROID CYST EXCISION       Family History   Problem Relation Age of Onset   • Lung cancer Mother      Immunization History   Administered Date(s) Administered   • Flulaval/Fluarix/Fluzone Quad 10/14/2019   • Influenza, Unspecified 01/08/2019, 10/05/2019       Lab Requisition on 12/10/2020   Component Date Value Ref Range Status   • Case Report 12/10/2020    Final                    Value:Surgical Pathology Report                         Case: [id number]                                  Authorizing Provider:  Erasmo Lara MD       Collected:           12/10/2020 11:10 AM  "         Ordering Location:     [facility]       Received:            12/10/2020 12:42 PM                                 LABORATORY                                                                   Pathologist:           Warren Crain MD                                                            Specimen:    Breast, Left, Left breast mass                                                            • Final Diagnosis 12/10/2020    Final                    Value:This result contains rich text formatting which cannot be displayed here.   • Gross Description 12/10/2020    Final                    Value:This result contains rich text formatting which cannot be displayed here.         Review of Systems   Constitutional: Negative.    HENT: Negative.    Respiratory: Negative.    Cardiovascular: Negative.    Gastrointestinal: Negative.    Genitourinary: Negative.    Musculoskeletal: Negative.    Skin: Negative.    Neurological: Negative.    Psychiatric/Behavioral: Negative.        Objective   Physical Exam  Constitutional:       Appearance: Normal appearance.   HENT:      Head: Normocephalic.   Cardiovascular:      Rate and Rhythm: Normal rate and regular rhythm.      Pulses: Normal pulses.      Heart sounds: Normal heart sounds.   Pulmonary:      Effort: Pulmonary effort is normal.      Breath sounds: Normal breath sounds.   Abdominal:      General: Bowel sounds are normal.   Musculoskeletal:         General: Normal range of motion.      Cervical back: Normal range of motion.   Skin:     General: Skin is warm and dry.   Neurological:      General: No focal deficit present.      Mental Status: She is alert and oriented to person, place, and time.   Psychiatric:         Mood and Affect: Mood normal.         Behavior: Behavior normal.         Procedures    Assessment/Plan   Diagnoses and all orders for this visit:    1. Acquired hypothyroidism (Primary)  -     TSH+Free T4; Future  -     T3; Future    2. Preventative  health care  -     CBC & Differential; Future  -     Comprehensive Metabolic Panel; Future  -     Hemoglobin A1c; Future  -     Lipid Panel With LDL / HDL Ratio; Future    3. BMI 26.0-26.9,adult    Other orders  -     ZOLMitriptan (ZOMIG) 5 MG tablet; Take 1 tablet by mouth 1 (One) Time As Needed for Migraine for up to 1 dose.  Dispense: 36 tablet; Refill: 1  -     Discontinue: estradiol (ESTRACE) 1 MG tablet; Take 1 tablet by mouth Daily.  Dispense: 90 tablet; Refill: 1  -     chlorthalidone (HYGROTON) 25 MG tablet; Take 0.5 tablets by mouth Every Morning.  Dispense: 45 tablet; Refill: 1  -     estradiol (ESTRACE) 1 MG tablet; Take 1 tablet by mouth Daily.  Dispense: 90 tablet; Refill: 1          Current Outpatient Medications:   •  chlorthalidone (HYGROTON) 25 MG tablet, Take 0.5 tablets by mouth Every Morning., Disp: 45 tablet, Rfl: 1  •  estradiol (ESTRACE) 1 MG tablet, Take 1 tablet by mouth Daily., Disp: 90 tablet, Rfl: 1  •  levothyroxine (SYNTHROID, LEVOTHROID) 75 MCG tablet, TAKE 1 TABLET BY MOUTH DAILY. NO FURTHER REFILLS UNTIL SEEN IN OFFICE WITH FASTING BLOOD WORK, Disp: 30 tablet, Rfl: 0  •  ZOLMitriptan (ZOMIG) 5 MG tablet, Take 1 tablet by mouth 1 (One) Time As Needed for Migraine for up to 1 dose., Disp: 36 tablet, Rfl: 1

## 2021-05-06 RX ORDER — LEVOTHYROXINE SODIUM 0.07 MG/1
75 TABLET ORAL DAILY
Qty: 30 TABLET | Refills: 0 | Status: SHIPPED | OUTPATIENT
Start: 2021-05-06 | End: 2021-06-13 | Stop reason: SDUPTHER

## 2021-06-10 ENCOUNTER — TELEPHONE (OUTPATIENT)
Dept: FAMILY MEDICINE CLINIC | Facility: CLINIC | Age: 61
End: 2021-06-10

## 2021-06-10 RX ORDER — LEVOTHYROXINE SODIUM 0.07 MG/1
75 TABLET ORAL DAILY
Qty: 30 TABLET | Refills: 4 | Status: CANCELLED | OUTPATIENT
Start: 2021-06-10

## 2021-06-10 NOTE — TELEPHONE ENCOUNTER
PATIENT WAS IN FOR BLOOD WORK IN MAY FOR MED REFILLS BUT THE PHARMACY   HAS NOT RECEIVED THE LEVOTHYROXIN RX FOR THE PATIENT YET.    CAN YOU CALL IN REFILLS FOR PATIENT FOR 90 DAY SUPPLY PLEASE    SHE IS OUT OF MEDICATION AND NEEDS Marion Mcbride (Self) [phone] (H)     [facility]/pharmacy #[id number] - [place], [place] - [address] [place] [address] AT [address] [phone]  - [phone] [phone]

## 2021-06-13 RX ORDER — LEVOTHYROXINE SODIUM 0.07 MG/1
75 TABLET ORAL DAILY
Qty: 90 TABLET | Refills: 1 | Status: SHIPPED | OUTPATIENT
Start: 2021-06-13 | End: 2021-12-06

## 2021-06-13 NOTE — TELEPHONE ENCOUNTER
It was ordered on May 6, 2021  I will be glad to order it again cannot always play with the pharmacy tells you

## 2021-07-06 RX ORDER — ZOLMITRIPTAN 5 MG/1
TABLET, FILM COATED ORAL
Qty: 36 TABLET | Refills: 1 | Status: SHIPPED | OUTPATIENT
Start: 2021-07-06 | End: 2021-09-14

## 2021-08-02 RX ORDER — ESTRADIOL 1 MG/1
TABLET ORAL
Qty: 90 TABLET | Refills: 1 | Status: SHIPPED | OUTPATIENT
Start: 2021-08-02 | End: 2022-03-31

## 2021-08-02 RX ORDER — CHLORTHALIDONE 25 MG/1
TABLET ORAL
Qty: 45 TABLET | Refills: 1 | Status: SHIPPED | OUTPATIENT
Start: 2021-08-02 | End: 2022-03-31

## 2021-08-16 ENCOUNTER — TELEPHONE (OUTPATIENT)
Dept: FAMILY MEDICINE CLINIC | Facility: CLINIC | Age: 61
End: 2021-08-16

## 2021-08-16 NOTE — TELEPHONE ENCOUNTER
Pt called stating she needs an order for a f/u mammo that was due in June. Pt states her last mammo was last winter, which came back abnormal and she had a biopsy done that came back benign. Pt wants [facility] done at [facility]

## 2021-08-17 DIAGNOSIS — Z12.31 OTHER SCREENING MAMMOGRAM: Primary | ICD-10-CM

## 2021-08-17 NOTE — TELEPHONE ENCOUNTER
Spoke with pt, advised her order has been placed and sent to [facility], and she can schedule at her convenience

## 2021-08-23 ENCOUNTER — TELEPHONE (OUTPATIENT)
Dept: FAMILY MEDICINE CLINIC | Facility: CLINIC | Age: 61
End: 2021-08-23

## 2021-08-23 DIAGNOSIS — Z12.31 OTHER SCREENING MAMMOGRAM: Primary | ICD-10-CM

## 2021-08-24 ENCOUNTER — TELEPHONE (OUTPATIENT)
Dept: FAMILY MEDICINE CLINIC | Facility: CLINIC | Age: 61
End: 2021-08-24

## 2021-08-24 DIAGNOSIS — R92.8 ABNORMAL MAMMOGRAM: Primary | ICD-10-CM

## 2021-08-24 DIAGNOSIS — Z12.31 OTHER SCREENING MAMMOGRAM: ICD-10-CM

## 2021-09-14 RX ORDER — ZOLMITRIPTAN 5 MG/1
TABLET, FILM COATED ORAL
Qty: 36 TABLET | Refills: 1 | Status: SHIPPED | OUTPATIENT
Start: 2021-09-14 | End: 2021-11-15

## 2021-11-09 ENCOUNTER — OFFICE VISIT (OUTPATIENT)
Dept: FAMILY MEDICINE CLINIC | Facility: CLINIC | Age: 61
End: 2021-11-09

## 2021-11-09 VITALS
HEART RATE: 80 BPM | WEIGHT: 141.4 LBS | HEIGHT: 62 IN | OXYGEN SATURATION: 99 % | BODY MASS INDEX: 26.02 KG/M2 | SYSTOLIC BLOOD PRESSURE: 156 MMHG | TEMPERATURE: 97.7 F | DIASTOLIC BLOOD PRESSURE: 88 MMHG

## 2021-11-09 DIAGNOSIS — R73.09 ELEVATED HEMOGLOBIN A1C: ICD-10-CM

## 2021-11-09 DIAGNOSIS — R33.9 INCOMPLETE EMPTYING OF BLADDER: ICD-10-CM

## 2021-11-09 DIAGNOSIS — Z00.00 PREVENTATIVE HEALTH CARE: ICD-10-CM

## 2021-11-09 DIAGNOSIS — E03.9 ACQUIRED HYPOTHYROIDISM: Primary | ICD-10-CM

## 2021-11-09 DIAGNOSIS — Z13.220 LIPID SCREENING: ICD-10-CM

## 2021-11-09 PROBLEM — H52.223 REGULAR ASTIGMATISM OF BOTH EYES: Status: ACTIVE | Noted: 2020-08-08

## 2021-11-09 PROBLEM — H25.813 COMBINED FORMS OF AGE-RELATED CATARACT OF BOTH EYES: Status: ACTIVE | Noted: 2020-08-08

## 2021-11-09 PROCEDURE — 99213 OFFICE O/P EST LOW 20 MIN: CPT | Performed by: NURSE PRACTITIONER

## 2021-11-09 NOTE — PROGRESS NOTES
"    Marion Ro is a 60 y.o. female.     60-year-old white female with history of hypothyroidism, arthritis, hysterectomy, headaches and hypertension who comes in today for follow-up visit fasting blood work  Blood pressure 156/88 heart rate 80 she denies any chest pain, dyspnea, tachycardia or dizziness    Patient is getting ready to retire states things are going very well for her.  She did have an abnormal mammogram with a normal ultrasound follow-up.  Weight is 141 with a BMI 25.9 she is still refusing Covid vaccinations and still needs to schedule an eye exam but is up-to-date on mammogram and her colonoscopy is due in April 2022              Fasting blood work  Reconsider Covid vaccine  Colonoscopy due in April 2022  Follow-up 6 months       The following portions of the patient's history were reviewed and updated as appropriate: allergies, current medications, past family history, past medical history, past social history, past surgical history and problem list.    Vitals:    11/09/21 0829   BP: 156/88   BP Location: Left arm   Patient Position: Sitting   Cuff Size: Adult   Pulse: 80   Temp: 97.7 °F (36.5 °C)   TempSrc: Temporal   SpO2: 99%   Weight: 64.1 kg (141 lb 6.4 oz)   Height: 157.5 cm (62\")     Body mass index is 25.86 kg/m².    Past Medical History:   Diagnosis Date   • Hypothyroidism      Past Surgical History:   Procedure Laterality Date   • BILATERAL BREAST REDUCTION     • BONY PELVIS SURGERY     • HYSTERECTOMY     • KNEE ARTHROPLASTY, PARTIAL REPLACEMENT Right    • THYROID CYST EXCISION       Family History   Problem Relation Age of Onset   • Lung cancer Mother      Immunization History   Administered Date(s) Administered   • FluLaval/Fluarix/Fluzone >6 10/14/2019   • Influenza, Unspecified 01/08/2019, 10/05/2019       Results Encounter on 05/10/2021   Component Date Value Ref Range Status   • WBC 05/14/2021 6.8  3.4 - 10.8 x10E3/uL Final   • RBC 05/14/2021 4.72  3.77 - 5.28 x10E6/uL Final "   • Hemoglobin 05/14/2021 14.1  11.1 - 15.9 g/dL Final   • Hematocrit 05/14/2021 42.8  34.0 - 46.6 % Final   • MCV 05/14/2021 91  79 - 97 fL Final   • MCH 05/14/2021 29.9  26.6 - 33.0 pg Final   • MCHC 05/14/2021 32.9  31.5 - 35.7 g/dL Final   • RDW 05/14/2021 11.9  11.7 - 15.4 % Final   • Platelets 05/14/2021 284  150 - 450 x10E3/uL Final   • Neutrophil Rel % 05/14/2021 66  Not Estab. % Final   • Lymphocyte Rel % 05/14/2021 19  Not Estab. % Final   • Monocyte Rel % 05/14/2021 11  Not Estab. % Final   • Eosinophil Rel % 05/14/2021 3  Not Estab. % Final   • Basophil Rel % 05/14/2021 1  Not Estab. % Final   • Neutrophils Absolute 05/14/2021 4.5  1.4 - 7.0 x10E3/uL Final   • Lymphocytes Absolute 05/14/2021 1.3  0.7 - 3.1 x10E3/uL Final   • Monocytes Absolute 05/14/2021 0.8  0.1 - 0.9 x10E3/uL Final   • Eosinophils Absolute 05/14/2021 0.2  0.0 - 0.4 x10E3/uL Final   • Basophils Absolute 05/14/2021 0.1  0.0 - 0.2 x10E3/uL Final   • Immature Granulocyte Rel % 05/14/2021 0  Not Estab. % Final   • Immature Grans Absolute 05/14/2021 0.0  0.0 - 0.1 x10E3/uL Final   • Glucose 05/14/2021 95  65 - 99 mg/dL Final   • BUN 05/14/2021 13  6 - 24 mg/dL Final   • Creatinine 05/14/2021 0.62  0.57 - 1.00 mg/dL Final   • eGFR Non African Am 05/14/2021 99  >59 mL/min/1.73 Final   • eGFR African Am 05/14/2021 114  >59 mL/min/1.73 Final    Comment: **[facility] currently reports eGFR in compliance with the current**    recommendations of the [employer]. [facility] will    update reporting as new guidelines are published from the NKF-ASN    Task force.     • BUN/Creatinine Ratio 05/14/2021 21  9 - 23 Final   • Sodium 05/14/2021 142  134 - 144 mmol/L Final   • Potassium 05/14/2021 3.8  3.5 - 5.2 mmol/L Final   • Chloride 05/14/2021 98  96 - 106 mmol/L Final   • Total CO2 05/14/2021 30* 20 - 29 mmol/L Final   • Calcium 05/14/2021 9.5  8.7 - 10.2 mg/dL Final   • Total Protein 05/14/2021 6.9  6.0 - 8.5 g/dL Final   • Albumin 05/14/2021  4.3  3.8 - 4.9 g/dL Final   • Globulin 05/14/2021 2.6  1.5 - 4.5 g/dL Final   • A/G Ratio 05/14/2021 1.7  1.2 - 2.2 Final   • Total Bilirubin 05/14/2021 0.3  0.0 - 1.2 mg/dL Final   • Alkaline Phosphatase 05/14/2021 88  39 - 117 IU/L Final    Comment: **Effective May 17, 2021 Alkaline Phosphatase**    reference interval will be changing to:               Age                Male          Female            0 -  5 days         52 - 127       52 - 127            6 - 10 days         34 - 242       34 - 242           11 - 20 days        114 - 357      114 - 357           21 - 30 days        107 - 494      107 - 494            1 -  2 months      162 - 539      162 - 539            3 -  6 months      141 - 452      141 - 452            7 - 11 months      128 - 401      128 - 401    12 months -  6 years       170 - 369      170 - 369            7 - 12 years       161 - 409      161 - 409                13 years       166 - 435       83 - 227                14 years       121 - 375       68 - 161                15 years        94 - 279       60 - 134                16 years        78 - 207       55 - 121                17 years        67 - 161       50 - 113           18 - 20 years        55 - 125       45 - 106               >20 years                                   48 - 121       48 - 121     • AST (SGOT) 05/14/2021 15  0 - 40 IU/L Final   • ALT (SGPT) 05/14/2021 13  0 - 32 IU/L Final   • Hemoglobin A1C 05/14/2021 6.2* 4.8 - 5.6 % Final    Comment:          Prediabetes: 5.7 - 6.4           Diabetes: >6.4           Glycemic control for adults with diabetes: <7.0     • Total Cholesterol 05/14/2021 166  100 - 199 mg/dL Final   • Triglycerides 05/14/2021 132  0 - 149 mg/dL Final   • HDL Cholesterol 05/14/2021 57  >39 mg/dL Final   • VLDL Cholesterol Pancho 05/14/2021 23  5 - 40 mg/dL Final   • LDL Chol Calc (NIH) 05/14/2021 86  0 - 99 mg/dL Final   • LDL/HDL RATIO 05/14/2021 1.5  0.0 - 3.2 ratio Final    Comment:                                      LDL/HDL Ratio                                              Men  Women                                1/2 Avg.Risk  1.0    1.5                                    Avg.Risk  3.6    3.2                                 2X Avg.Risk  6.2    5.0                                 3X Avg.Risk  8.0    6.1     • TSH 05/14/2021 2.940  0.450 - 4.500 uIU/mL Final   • Free T4 05/14/2021 1.42  0.82 - 1.77 ng/dL Final   • T3, Total 05/14/2021 132  71 - 180 ng/dL Final         Review of Systems   Constitutional: Negative.    HENT: Negative.    Respiratory: Negative.    Cardiovascular: Negative.    Gastrointestinal: Negative.    Genitourinary: Negative.    Musculoskeletal: Negative.    Skin: Negative.    Psychiatric/Behavioral: Negative.        Objective   Physical Exam  Constitutional:       Appearance: Normal appearance.   HENT:      Head: Normocephalic.   Cardiovascular:      Rate and Rhythm: Normal rate and regular rhythm.      Pulses: Normal pulses.      Heart sounds: Normal heart sounds.   Pulmonary:      Effort: Pulmonary effort is normal.      Breath sounds: Normal breath sounds.   Skin:     General: Skin is warm and dry.   Neurological:      General: No focal deficit present.      Mental Status: She is alert and oriented to person, place, and time.   Psychiatric:         Mood and Affect: Mood normal.         Behavior: Behavior normal.         Procedures    Assessment/Plan   Problems Addressed this Visit        Cardiac and Vasculature    Lipid screening    Relevant Orders    Lipid Panel With LDL / HDL Ratio       Endocrine and Metabolic    Hypothyroidism - Primary    Relevant Orders    TSH+Free T4    T3       Genitourinary and Reproductive     Incomplete emptying of bladder      Other Visit Diagnoses     Elevated hemoglobin A1c        Relevant Orders    Hemoglobin A1c    Preventative health care        Relevant Orders    Comprehensive Metabolic Panel      Diagnoses       Codes Comments    Acquired  hypothyroidism    -  Primary ICD-10-CM: E03.9  ICD-9-CM: 244.9     Lipid screening     ICD-10-CM: Z13.220  ICD-9-CM: V77.91     Elevated hemoglobin A1c     ICD-10-CM: R73.09  ICD-9-CM: 790.29     Preventative health care     ICD-10-CM: Z00.00  ICD-9-CM: V70.0     Incomplete emptying of bladder     ICD-10-CM: R33.9  ICD-9-CM: 788.21             Current Outpatient Medications:   •  chlorthalidone (HYGROTON) 25 MG tablet, TAKE 1/2 TABLET BY MOUTH EVERY MORNING, Disp: 45 tablet, Rfl: 1  •  estradiol (ESTRACE) 1 MG tablet, TAKE 1 TABLET BY MOUTH EVERY DAY, Disp: 90 tablet, Rfl: 1  •  levothyroxine (SYNTHROID, LEVOTHROID) 75 MCG tablet, Take 1 tablet by mouth Daily. No further refills until seen in office with fasting blood work, Disp: 90 tablet, Rfl: 1  •  ZOLMitriptan (ZOMIG) 5 MG tablet, TAKE 1 TABLET BY MOUTH AS NEEDED FOR MIGRAINE FRO UP TO 1 DOSE, Disp: 36 tablet, Rfl: 1

## 2021-11-10 LAB
ALBUMIN SERPL-MCNC: 4.3 G/DL (ref 3.8–4.9)
ALBUMIN/GLOB SERPL: 1.6 {RATIO} (ref 1.2–2.2)
ALP SERPL-CCNC: 104 IU/L (ref 44–121)
ALT SERPL-CCNC: 13 IU/L (ref 0–32)
AST SERPL-CCNC: 14 IU/L (ref 0–40)
BILIRUB SERPL-MCNC: 0.3 MG/DL (ref 0–1.2)
BUN SERPL-MCNC: 13 MG/DL (ref 6–24)
BUN/CREAT SERPL: 19 (ref 9–23)
CALCIUM SERPL-MCNC: 9.8 MG/DL (ref 8.7–10.2)
CHLORIDE SERPL-SCNC: 100 MMOL/L (ref 96–106)
CHOLEST SERPL-MCNC: 179 MG/DL (ref 100–199)
CO2 SERPL-SCNC: 27 MMOL/L (ref 20–29)
CREAT SERPL-MCNC: 0.68 MG/DL (ref 0.57–1)
GLOBULIN SER CALC-MCNC: 2.7 G/DL (ref 1.5–4.5)
GLUCOSE SERPL-MCNC: 97 MG/DL (ref 65–99)
HBA1C MFR BLD: 6.1 % (ref 4.8–5.6)
HDLC SERPL-MCNC: 61 MG/DL
LDLC SERPL CALC-MCNC: 99 MG/DL (ref 0–99)
LDLC/HDLC SERPL: 1.6 RATIO (ref 0–3.2)
POTASSIUM SERPL-SCNC: 3.6 MMOL/L (ref 3.5–5.2)
PROT SERPL-MCNC: 7 G/DL (ref 6–8.5)
SODIUM SERPL-SCNC: 140 MMOL/L (ref 134–144)
T3 SERPL-MCNC: 129 NG/DL (ref 71–180)
T4 FREE SERPL-MCNC: 1.31 NG/DL (ref 0.82–1.77)
TRIGL SERPL-MCNC: 105 MG/DL (ref 0–149)
TSH SERPL DL<=0.005 MIU/L-ACNC: 2.76 UIU/ML (ref 0.45–4.5)
VLDLC SERPL CALC-MCNC: 19 MG/DL (ref 5–40)

## 2021-11-15 RX ORDER — ZOLMITRIPTAN 5 MG/1
TABLET, FILM COATED ORAL
Qty: 36 TABLET | Refills: 1 | Status: SHIPPED | OUTPATIENT
Start: 2021-11-15 | End: 2022-01-03

## 2021-12-06 RX ORDER — LEVOTHYROXINE SODIUM 0.07 MG/1
75 TABLET ORAL DAILY
Qty: 90 TABLET | Refills: 1 | Status: SHIPPED | OUTPATIENT
Start: 2021-12-06 | End: 2022-05-31

## 2022-01-03 RX ORDER — ZOLMITRIPTAN 5 MG/1
TABLET, FILM COATED ORAL
Qty: 36 TABLET | Refills: 1 | Status: SHIPPED | OUTPATIENT
Start: 2022-01-03 | End: 2023-01-31 | Stop reason: SDUPTHER

## 2022-03-16 ENCOUNTER — TELEPHONE (OUTPATIENT)
Dept: FAMILY MEDICINE CLINIC | Facility: CLINIC | Age: 62
End: 2022-03-16

## 2022-03-16 RX ORDER — FLUCONAZOLE 100 MG/1
100 TABLET ORAL DAILY
Qty: 2 TABLET | Refills: 0 | Status: SHIPPED | OUTPATIENT
Start: 2022-03-16 | End: 2022-03-20

## 2022-03-16 NOTE — TELEPHONE ENCOUNTER
Caller: Marion Ro    Relationship: Self    Best call back number:[phone]    What medication are you requesting: DIFLOCAN    What are your current symptoms: YEAST INFECTION    How long have you been experiencing symptoms: 3 DAYS  Have you had these symptoms before:    [x] Yes  [] No    Have you been treated for these symptoms before:   [x] Yes  [] No    If a prescription is needed, what is your preferred pharmacy and phone number: [facility]/PHARMACY #[id number] - [place] [address] - [phone] [facility] [phone]     Additional notes: PATIENT IS REQUESTING MEDICINE FOR A YEAST INFECTION. SHE IS REQUESTING 2 ORDERS OF THE MEDICATION. SHE STATED IT USUALLY TAKES 2 DOSES.

## 2022-03-25 RX ORDER — FLUCONAZOLE 150 MG/1
150 TABLET ORAL ONCE
Qty: 1 TABLET | Refills: 0 | Status: SHIPPED | OUTPATIENT
Start: 2022-03-25 | End: 2022-03-25

## 2022-03-25 NOTE — TELEPHONE ENCOUNTER
I will send her in another Diflucan.  In looking at the chart, Saba sent this in via phone.  If this Diflucan does not take care of her symptoms, and they may be caused by something else and she needs to be evaluated in person.  Hope this helps.  Thanks

## 2022-03-25 NOTE — TELEPHONE ENCOUNTER
Patient states the Diflucan did help but it is not fully gone yet. She would like to know if she could get another script sent in. I told her Saba was not here today. I could as Dr Alfaro. I also asked if she had a gyn she states no due to have a hysterectomy years ago.     Are you willing to give this patient a refill? Please advise.

## 2022-03-25 NOTE — TELEPHONE ENCOUNTER
PATIENT STATES SHE IS STILL HAVING SOME SYMPTOMS AND IS ASKING IF THERE IS SOMETHING ELSE SHE CAN TRY BECAUSE THE DIFLUCAN DID NOT TAKE CARE OF IT COMPLETELY.     PLEASE ADVISE ASAP.    CONTACT: [phone] (h)

## 2022-03-31 RX ORDER — ESTRADIOL 1 MG/1
TABLET ORAL
Qty: 90 TABLET | Refills: 0 | Status: SHIPPED | OUTPATIENT
Start: 2022-03-31 | End: 2022-06-20

## 2022-03-31 RX ORDER — CHLORTHALIDONE 25 MG/1
TABLET ORAL
Qty: 45 TABLET | Refills: 0 | Status: SHIPPED | OUTPATIENT
Start: 2022-03-31 | End: 2022-06-20

## 2022-04-21 ENCOUNTER — OFFICE VISIT (OUTPATIENT)
Dept: FAMILY MEDICINE CLINIC | Facility: CLINIC | Age: 62
End: 2022-04-21

## 2022-04-21 VITALS
BODY MASS INDEX: 26.65 KG/M2 | WEIGHT: 144.8 LBS | HEIGHT: 62 IN | OXYGEN SATURATION: 98 % | HEART RATE: 92 BPM | SYSTOLIC BLOOD PRESSURE: 133 MMHG | DIASTOLIC BLOOD PRESSURE: 80 MMHG | TEMPERATURE: 97.6 F

## 2022-04-21 DIAGNOSIS — T78.40XA ALLERGIC REACTION, INITIAL ENCOUNTER: ICD-10-CM

## 2022-04-21 DIAGNOSIS — N89.8 VAGINAL DISCHARGE: Primary | ICD-10-CM

## 2022-04-21 DIAGNOSIS — Z00.00 PREVENTATIVE HEALTH CARE: ICD-10-CM

## 2022-04-21 DIAGNOSIS — Z13.220 LIPID SCREENING: ICD-10-CM

## 2022-04-21 DIAGNOSIS — R73.09 ELEVATED HEMOGLOBIN A1C: ICD-10-CM

## 2022-04-21 DIAGNOSIS — E03.9 ACQUIRED HYPOTHYROIDISM: ICD-10-CM

## 2022-04-21 PROCEDURE — 99214 OFFICE O/P EST MOD 30 MIN: CPT | Performed by: NURSE PRACTITIONER

## 2022-04-21 RX ORDER — PREDNISONE 5 MG/1
TABLET ORAL
Qty: 20 TABLET | Refills: 0 | Status: SHIPPED | OUTPATIENT
Start: 2022-04-21 | End: 2022-10-12 | Stop reason: SDUPTHER

## 2022-04-21 RX ORDER — VALACYCLOVIR HYDROCHLORIDE 1 G/1
TABLET, FILM COATED ORAL
Qty: 10 TABLET | Refills: 2 | Status: SHIPPED | OUTPATIENT
Start: 2022-04-21 | End: 2022-10-25 | Stop reason: ALTCHOICE

## 2022-04-21 NOTE — PROGRESS NOTES
Marion Ro is a 61 y.o. female.     61-year-old white female with history of hypothyroidism, arthritis, hysterectomy, headaches and hypertension who comes in today for follow-up visit and fasting blood work    Blood pressure 132/80 heart rate 92 with small systolic murmur she denies any chest pain, dyspnea, tachycardia or dizziness    Patient has mildly elevated A1c at 6.1 with a normal fasting blood sugar and does not know of any history of diabetes in the family.  I recommended she watch obvious sweets and avoid excessive carbs.  We will be drawing fasting blood work    Patient complains of 4-day history of lip burning and slightly puffy and being redder than normal.  I am not quite sure if she is having allergic reaction or a unusual presentation of herpes simplex 1.  Unguinal place her on Valtrex and a low-dose of steroids and told patient to keep lips covered.  Also recommend she might take Zyrtec daily for 3 days    Patient has had a total hysterectomy but is having a white vaginal discharge without any other symptoms or odor.  She has not had a vaginal exam and for 5 years I am setting her up with OB/GYN.  She denies any painful intercourse or other issues    Weight is up 4 pounds at 145 a BMI 26.5.  Patient has not been vaccinated for COVID and still needs to schedule an eye exam.  I am scheduling her mammogram and DEXA scan in August and her colonoscopy is due this year I put the order in and gave her a green packet to schedule it        Fasting blood work  Valtrex 1 g twice daily x5 days  Prednisone 5 mg taper dose  Schedule colonoscopy  OB/GYN referral  Schedule eye exam  Reconsider COVID vaccines       The following portions of the patient's history were reviewed and updated as appropriate: allergies, current medications, past family history, past medical history, past social history, past surgical history and problem list.    Vitals:    04/21/22 0958   BP: 133/80   BP Location: Right arm  "  Patient Position: Sitting   Cuff Size: Adult   Pulse: 92   Temp: 97.6 °F (36.4 °C)   TempSrc: Temporal   SpO2: 98%   Weight: 65.7 kg (144 lb 12.8 oz)   Height: 157.5 cm (62\")     Body mass index is 26.48 kg/m².    Past Medical History:   Diagnosis Date   • Hypothyroidism      Past Surgical History:   Procedure Laterality Date   • BILATERAL BREAST REDUCTION     • BONY PELVIS SURGERY     • HYSTERECTOMY     • KNEE ARTHROPLASTY, PARTIAL REPLACEMENT Right    • THYROID CYST EXCISION       Family History   Problem Relation Age of Onset   • Lung cancer Mother      Immunization History   Administered Date(s) Administered   • FluLaval/Fluarix/Fluzone >6 10/14/2019   • Influenza, Unspecified 01/08/2019, 10/05/2019       Office Visit on 11/09/2021   Component Date Value Ref Range Status   • TSH 11/09/2021 2.760  0.450 - 4.500 uIU/mL Final   • Free T4 11/09/2021 1.31  0.82 - 1.77 ng/dL Final   • T3, Total 11/09/2021 129  71 - 180 ng/dL Final   • Total Cholesterol 11/09/2021 179  100 - 199 mg/dL Final   • Triglycerides 11/09/2021 105  0 - 149 mg/dL Final   • HDL Cholesterol 11/09/2021 61  >39 mg/dL Final   • VLDL Cholesterol Pancho 11/09/2021 19  5 - 40 mg/dL Final   • LDL Chol Calc ([facility]) 11/09/2021 99  0 - 99 mg/dL Final   • LDL/HDL RATIO 11/09/2021 1.6  0.0 - 3.2 ratio Final    Comment:                                     LDL/HDL Ratio                                              Men  Women                                1/2 Avg.Risk  1.0    1.5                                    Avg.Risk  3.6    3.2                                 2X Avg.Risk  6.2    5.0                                 3X Avg.Risk  8.0    6.1     • Hemoglobin A1C 11/09/2021 6.1 (A) 4.8 - 5.6 % Final    Comment:          Prediabetes: 5.7 - 6.4           Diabetes: >6.4           Glycemic control for adults with diabetes: <7.0     • Glucose 11/09/2021 97  65 - 99 mg/dL Final   • BUN 11/09/2021 13  6 - 24 mg/dL Final   • Creatinine 11/09/2021 0.68  0.57 - 1.00 " mg/dL Final   • eGFR Non  Am 11/09/2021 96  >59 mL/min/1.73 Final   • eGFR African Am 11/09/2021 111  >59 mL/min/1.73 Final    Comment: **In accordance with recommendations from the NKF-ASN Task force,**    [facility] is in the process of updating its eGFR calculation to the    2021 CKD-EPI creatinine equation that estimates kidney function    without a race variable.     • BUN/Creatinine Ratio 11/09/2021 19  9 - 23 Final   • Sodium 11/09/2021 140  134 - 144 mmol/L Final   • Potassium 11/09/2021 3.6  3.5 - 5.2 mmol/L Final   • Chloride 11/09/2021 100  96 - 106 mmol/L Final   • Total CO2 11/09/2021 27  20 - 29 mmol/L Final   • Calcium 11/09/2021 9.8  8.7 - 10.2 mg/dL Final   • Total Protein 11/09/2021 7.0  6.0 - 8.5 g/dL Final   • Albumin 11/09/2021 4.3  3.8 - 4.9 g/dL Final   • Globulin 11/09/2021 2.7  1.5 - 4.5 g/dL Final   • A/G Ratio 11/09/2021 1.6  1.2 - 2.2 Final   • Total Bilirubin 11/09/2021 0.3  0.0 - 1.2 mg/dL Final   • Alkaline Phosphatase 11/09/2021 104  44 - 121 IU/L Final                  **Please note reference interval change**   • AST (SGOT) 11/09/2021 14  0 - 40 IU/L Final   • ALT (SGPT) 11/09/2021 13  0 - 32 IU/L Final         Review of Systems    Objective   Physical Exam    Procedures    Assessment/Plan   Diagnoses and all orders for this visit:    1. Vaginal discharge (Primary)  -     Ambulatory Referral to Obstetrics / Gynecology    2. Acquired hypothyroidism  -     TSH+Free T4; Future  -     T3; Future    3. Elevated hemoglobin A1c  -     Hemoglobin A1c; Future    4. Preventative health care  -     CBC & Differential; Future  -     Comprehensive Metabolic Panel; Future    5. Lipid screening  -     Lipid Panel With LDL / HDL Ratio; Future    6. Allergic reaction, initial encounter    Other orders  -     predniSONE 5 MG (48) tablet therapy pack dose pack; Take 4 tablets by mouth Daily for 2 days, THEN 3 tablets Daily for 2 days, THEN 2 tablets Daily for 2 days, THEN 1 tablet Daily for 2  days.  Dispense: 20 tablet; Refill: 0  -     valACYclovir (VALTREX) 1000 MG tablet; Take one tab twice a day for 5 days at onset of fever blisters  Dispense: 10 tablet; Refill: 2          Current Outpatient Medications:   •  chlorthalidone (HYGROTON) 25 MG tablet, TAKE 1/2 TABLET BY MOUTH EVERY MORNING, Disp: 45 tablet, Rfl: 0  •  estradiol (ESTRACE) 1 MG tablet, TAKE 1 TABLET BY MOUTH EVERY DAY, Disp: 90 tablet, Rfl: 0  •  levothyroxine (SYNTHROID, LEVOTHROID) 75 MCG tablet, TAKE 1 TABLET BY MOUTH DAILY. NO FURTHER REFILLS UNTIL SEEN IN OFFICE WITH FASTING BLOOD WORK, Disp: 90 tablet, Rfl: 1  •  ZOLMitriptan (ZOMIG) 5 MG tablet, TAKE 1 TABLET BY MOUTH AS NEEDED FOR MIGRAINE FOR UP TO 1 DOSE, Disp: 36 tablet, Rfl: 1  •  predniSONE 5 MG (48) tablet therapy pack dose pack, Take 4 tablets by mouth Daily for 2 days, THEN 3 tablets Daily for 2 days, THEN 2 tablets Daily for 2 days, THEN 1 tablet Daily for 2 days., Disp: 20 tablet, Rfl: 0  •  valACYclovir (VALTREX) 1000 MG tablet, Take one tab twice a day for 5 days at onset of fever blisters, Disp: 10 tablet, Rfl: 2           ELAINE Lr 4/21/2022 10:25 EDT  This note has been electronically signed

## 2022-04-21 NOTE — PATIENT INSTRUCTIONS
Fasting blood work  Valtrex 1 g twice daily x5 days  Prednisone 5 mg taper dose  Schedule colonoscopy  OB/GYN referral  Schedule eye exam  Reconsider COVID vaccines

## 2022-04-26 LAB
ALBUMIN SERPL-MCNC: 4.1 G/DL (ref 3.8–4.9)
ALBUMIN/GLOB SERPL: 1.7 {RATIO} (ref 1.2–2.2)
ALP SERPL-CCNC: 81 IU/L (ref 44–121)
ALT SERPL-CCNC: 13 IU/L (ref 0–32)
AST SERPL-CCNC: 11 IU/L (ref 0–40)
BASOPHILS # BLD AUTO: 0.1 X10E3/UL (ref 0–0.2)
BASOPHILS NFR BLD AUTO: 1 %
BILIRUB SERPL-MCNC: 0.2 MG/DL (ref 0–1.2)
BUN SERPL-MCNC: 14 MG/DL (ref 8–27)
BUN/CREAT SERPL: 20 (ref 12–28)
CALCIUM SERPL-MCNC: 9.6 MG/DL (ref 8.7–10.3)
CHLORIDE SERPL-SCNC: 100 MMOL/L (ref 96–106)
CHOLEST SERPL-MCNC: 171 MG/DL (ref 100–199)
CO2 SERPL-SCNC: 27 MMOL/L (ref 20–29)
CREAT SERPL-MCNC: 0.69 MG/DL (ref 0.57–1)
EGFRCR SERPLBLD CKD-EPI 2021: 99 ML/MIN/1.73
EOSINOPHIL # BLD AUTO: 0.2 X10E3/UL (ref 0–0.4)
EOSINOPHIL NFR BLD AUTO: 3 %
ERYTHROCYTE [DISTWIDTH] IN BLOOD BY AUTOMATED COUNT: 12.3 % (ref 11.7–15.4)
GLOBULIN SER CALC-MCNC: 2.4 G/DL (ref 1.5–4.5)
GLUCOSE SERPL-MCNC: 86 MG/DL (ref 65–99)
HBA1C MFR BLD: 6.1 % (ref 4.8–5.6)
HCT VFR BLD AUTO: 41 % (ref 34–46.6)
HDLC SERPL-MCNC: 62 MG/DL
HGB BLD-MCNC: 13.7 G/DL (ref 11.1–15.9)
IMM GRANULOCYTES # BLD AUTO: 0 X10E3/UL (ref 0–0.1)
IMM GRANULOCYTES NFR BLD AUTO: 0 %
LDLC SERPL CALC-MCNC: 87 MG/DL (ref 0–99)
LDLC/HDLC SERPL: 1.4 RATIO (ref 0–3.2)
LYMPHOCYTES # BLD AUTO: 1.9 X10E3/UL (ref 0.7–3.1)
LYMPHOCYTES NFR BLD AUTO: 22 %
MCH RBC QN AUTO: 30.6 PG (ref 26.6–33)
MCHC RBC AUTO-ENTMCNC: 33.4 G/DL (ref 31.5–35.7)
MCV RBC AUTO: 92 FL (ref 79–97)
MONOCYTES # BLD AUTO: 0.9 X10E3/UL (ref 0.1–0.9)
MONOCYTES NFR BLD AUTO: 11 %
NEUTROPHILS # BLD AUTO: 5.5 X10E3/UL (ref 1.4–7)
NEUTROPHILS NFR BLD AUTO: 63 %
PLATELET # BLD AUTO: 317 X10E3/UL (ref 150–450)
POTASSIUM SERPL-SCNC: 3.6 MMOL/L (ref 3.5–5.2)
PROT SERPL-MCNC: 6.5 G/DL (ref 6–8.5)
RBC # BLD AUTO: 4.47 X10E6/UL (ref 3.77–5.28)
SODIUM SERPL-SCNC: 140 MMOL/L (ref 134–144)
T3 SERPL-MCNC: 109 NG/DL (ref 71–180)
T4 FREE SERPL-MCNC: 1.37 NG/DL (ref 0.82–1.77)
TRIGL SERPL-MCNC: 126 MG/DL (ref 0–149)
TSH SERPL DL<=0.005 MIU/L-ACNC: 2.71 UIU/ML (ref 0.45–4.5)
VLDLC SERPL CALC-MCNC: 22 MG/DL (ref 5–40)
WBC # BLD AUTO: 8.5 X10E3/UL (ref 3.4–10.8)

## 2022-05-31 RX ORDER — LEVOTHYROXINE SODIUM 0.07 MG/1
75 TABLET ORAL DAILY
Qty: 90 TABLET | Refills: 1 | Status: SHIPPED | OUTPATIENT
Start: 2022-05-31 | End: 2022-11-10

## 2022-06-20 RX ORDER — ESTRADIOL 1 MG/1
TABLET ORAL
Qty: 90 TABLET | Refills: 0 | Status: SHIPPED | OUTPATIENT
Start: 2022-06-20 | End: 2022-09-15

## 2022-06-20 RX ORDER — CHLORTHALIDONE 25 MG/1
TABLET ORAL
Qty: 45 TABLET | Refills: 0 | Status: SHIPPED | OUTPATIENT
Start: 2022-06-20 | End: 2022-09-15

## 2022-09-15 RX ORDER — CHLORTHALIDONE 25 MG/1
TABLET ORAL
Qty: 45 TABLET | Refills: 0 | Status: SHIPPED | OUTPATIENT
Start: 2022-09-15 | End: 2023-01-31 | Stop reason: SDUPTHER

## 2022-09-15 RX ORDER — ESTRADIOL 1 MG/1
TABLET ORAL
Qty: 90 TABLET | Refills: 0 | Status: SHIPPED | OUTPATIENT
Start: 2022-09-15 | End: 2022-12-06

## 2022-10-12 NOTE — TELEPHONE ENCOUNTER
Caller: Marion Ro    Relationship: Self    Best call back number: [phone]    Requested Prescriptions:   Requested Prescriptions     Pending Prescriptions Disp Refills   • predniSONE 5 MG (48) tablet therapy pack dose pack 20 tablet 0     Sig: Take 4 tablets by mouth Daily for 2 days, THEN 3 tablets Daily for 2 days, THEN 2 tablets Daily for 2 days, THEN 1 tablet Daily for 2 days.        Pharmacy where request should be sent: [facility]/PHARMACY #[id number] - [address] AT [place] [address] - [phone] [facility] [phone] FX     Additional details provided by patient: PATIENT STATES THAT SHE IS HAVING LIP IRRITATION LIKE SHE WAS THE LAST TIME SHE WAS PRESCRIBED THIS MEDICATION. PATIENT STATES THAT BOTH OF HER LIPS ARE IRRITATED, DRY, AND ITCHY. PATIENT STATES THAT THE STEROID WAS THE ONLY OF THE TWO MEDICATION THAT SHE WAS PRESCRIBED BEFORE THAT HELPED. PATIENT IS LEAVING FOR VACATION Monday AND IS HOPING TO GET THIS CLEARED UP BY THEN. PLEASE ADVISE.       Does the patient have less than a 3 day supply:  [x] Yes  [] No    Andrei Juan Rep   10/12/22 08:13 EDT

## 2022-10-14 RX ORDER — PREDNISONE 5 MG/1
TABLET ORAL
Qty: 20 TABLET | Refills: 0 | Status: SHIPPED | OUTPATIENT
Start: 2022-10-14 | End: 2022-10-22

## 2022-10-25 ENCOUNTER — OFFICE VISIT (OUTPATIENT)
Dept: FAMILY MEDICINE CLINIC | Facility: CLINIC | Age: 62
End: 2022-10-25

## 2022-10-25 VITALS
TEMPERATURE: 97.3 F | OXYGEN SATURATION: 98 % | SYSTOLIC BLOOD PRESSURE: 127 MMHG | HEART RATE: 95 BPM | HEIGHT: 62 IN | DIASTOLIC BLOOD PRESSURE: 81 MMHG | WEIGHT: 146.8 LBS | RESPIRATION RATE: 18 BRPM | BODY MASS INDEX: 27.02 KG/M2

## 2022-10-25 DIAGNOSIS — E55.9 VITAMIN D DEFICIENCY: ICD-10-CM

## 2022-10-25 DIAGNOSIS — K13.0 CHEILOSIS: Primary | ICD-10-CM

## 2022-10-25 DIAGNOSIS — E03.9 ACQUIRED HYPOTHYROIDISM: ICD-10-CM

## 2022-10-25 DIAGNOSIS — E78.00 PURE HYPERCHOLESTEROLEMIA: ICD-10-CM

## 2022-10-25 DIAGNOSIS — R73.09 ELEVATED HEMOGLOBIN A1C: ICD-10-CM

## 2022-10-25 DIAGNOSIS — Z13.220 LIPID SCREENING: ICD-10-CM

## 2022-10-25 PROCEDURE — 99213 OFFICE O/P EST LOW 20 MIN: CPT | Performed by: NURSE PRACTITIONER

## 2022-10-25 RX ORDER — MULTIPLE VITAMINS W/ MINERALS TAB 9MG-400MCG
1 TAB ORAL DAILY
COMMUNITY

## 2022-10-25 RX ORDER — CHLORHEXIDINE GLUCONATE 0.12 MG/ML
RINSE ORAL EVERY EVENING
COMMUNITY
Start: 2022-09-27

## 2022-10-25 RX ORDER — DOCUSATE SODIUM 100 MG/1
100 CAPSULE, LIQUID FILLED ORAL 2 TIMES DAILY
COMMUNITY

## 2022-10-25 RX ORDER — ERGOCALCIFEROL 1.25 MG/1
50000 CAPSULE ORAL
Qty: 8 CAPSULE | Refills: 0 | Status: SHIPPED | OUTPATIENT
Start: 2022-10-25 | End: 2022-10-25 | Stop reason: ALTCHOICE

## 2022-10-26 ENCOUNTER — CLINICAL SUPPORT (OUTPATIENT)
Dept: FAMILY MEDICINE CLINIC | Facility: CLINIC | Age: 62
End: 2022-10-26

## 2022-10-26 DIAGNOSIS — K13.0 CHEILOSIS: ICD-10-CM

## 2022-10-26 DIAGNOSIS — E03.9 ACQUIRED HYPOTHYROIDISM: ICD-10-CM

## 2022-10-26 DIAGNOSIS — Z13.220 LIPID SCREENING: ICD-10-CM

## 2022-10-26 DIAGNOSIS — E78.00 PURE HYPERCHOLESTEROLEMIA: ICD-10-CM

## 2022-10-26 DIAGNOSIS — E55.9 VITAMIN D DEFICIENCY: ICD-10-CM

## 2022-10-26 DIAGNOSIS — R73.09 ELEVATED HEMOGLOBIN A1C: ICD-10-CM

## 2022-10-26 LAB — HBA1C MFR BLD: 5.9 % (ref 3.5–5.6)

## 2022-10-26 PROCEDURE — 36415 COLL VENOUS BLD VENIPUNCTURE: CPT | Performed by: NURSE PRACTITIONER

## 2022-10-26 PROCEDURE — 82607 VITAMIN B-12: CPT | Performed by: NURSE PRACTITIONER

## 2022-10-26 PROCEDURE — 80053 COMPREHEN METABOLIC PANEL: CPT | Performed by: NURSE PRACTITIONER

## 2022-10-26 PROCEDURE — 82306 VITAMIN D 25 HYDROXY: CPT | Performed by: NURSE PRACTITIONER

## 2022-10-26 PROCEDURE — 84436 ASSAY OF TOTAL THYROXINE: CPT | Performed by: NURSE PRACTITIONER

## 2022-10-26 PROCEDURE — 83036 HEMOGLOBIN GLYCOSYLATED A1C: CPT | Performed by: NURSE PRACTITIONER

## 2022-10-26 PROCEDURE — 80061 LIPID PANEL: CPT | Performed by: NURSE PRACTITIONER

## 2022-10-26 PROCEDURE — 84443 ASSAY THYROID STIM HORMONE: CPT | Performed by: NURSE PRACTITIONER

## 2022-10-27 LAB
25(OH)D3 SERPL-MCNC: 61.3 NG/ML (ref 30–100)
ALBUMIN SERPL-MCNC: 3.8 G/DL (ref 3.5–5.2)
ALBUMIN/GLOB SERPL: 1.8 G/DL
ALP SERPL-CCNC: 78 U/L (ref 39–117)
ALT SERPL W P-5'-P-CCNC: 14 U/L (ref 1–33)
ANION GAP SERPL CALCULATED.3IONS-SCNC: 9.6 MMOL/L (ref 5–15)
AST SERPL-CCNC: 11 U/L (ref 1–32)
BILIRUB SERPL-MCNC: 0.3 MG/DL (ref 0–1.2)
BUN SERPL-MCNC: 10 MG/DL (ref 8–23)
BUN/CREAT SERPL: 14.7 (ref 7–25)
CALCIUM SPEC-SCNC: 9.9 MG/DL (ref 8.6–10.5)
CHLORIDE SERPL-SCNC: 101 MMOL/L (ref 98–107)
CHOLEST SERPL-MCNC: 175 MG/DL (ref 0–200)
CO2 SERPL-SCNC: 30.4 MMOL/L (ref 22–29)
CREAT SERPL-MCNC: 0.68 MG/DL (ref 0.57–1)
EGFRCR SERPLBLD CKD-EPI 2021: 99.8 ML/MIN/1.73
GLOBULIN UR ELPH-MCNC: 2.1 GM/DL
GLUCOSE SERPL-MCNC: 84 MG/DL (ref 65–99)
HDLC SERPL-MCNC: 63 MG/DL (ref 40–60)
LDLC SERPL CALC-MCNC: 94 MG/DL (ref 0–100)
LDLC/HDLC SERPL: 1.47 {RATIO}
POTASSIUM SERPL-SCNC: 4 MMOL/L (ref 3.5–5.2)
PROT SERPL-MCNC: 5.9 G/DL (ref 6–8.5)
SODIUM SERPL-SCNC: 141 MMOL/L (ref 136–145)
T4 SERPL-MCNC: 8.64 MCG/DL (ref 4.5–11.7)
TRIGL SERPL-MCNC: 98 MG/DL (ref 0–150)
TSH SERPL DL<=0.05 MIU/L-ACNC: 2.56 UIU/ML (ref 0.27–4.2)
VIT B12 BLD-MCNC: 541 PG/ML (ref 211–946)
VLDLC SERPL-MCNC: 18 MG/DL (ref 5–40)

## 2022-11-07 ENCOUNTER — TELEPHONE (OUTPATIENT)
Dept: FAMILY MEDICINE CLINIC | Facility: CLINIC | Age: 62
End: 2022-11-07

## 2022-11-07 NOTE — TELEPHONE ENCOUNTER
Caller: Marion Ro    Relationship: Self    Best call back number: [phone]    What test was performed: LABS    When was the test performed: 10.26.22    Where was the test performed: IN OFFICE    Additional notes: PATIENT WOULD LIKE A CALL BACK WITH THE RESULTS

## 2022-11-08 ENCOUNTER — TELEPHONE (OUTPATIENT)
Dept: FAMILY MEDICINE CLINIC | Facility: CLINIC | Age: 62
End: 2022-11-08

## 2022-11-08 DIAGNOSIS — K13.0 INFLAMMATION OF LIPS: Primary | ICD-10-CM

## 2022-11-08 RX ORDER — METHYLPREDNISOLONE 4 MG/1
TABLET ORAL
Qty: 21 TABLET | Refills: 0 | Status: SHIPPED | OUTPATIENT
Start: 2022-11-08

## 2022-11-08 RX ORDER — ERGOCALCIFEROL 1.25 MG/1
50000 CAPSULE ORAL
Qty: 8 CAPSULE | Refills: 0 | Status: SHIPPED | OUTPATIENT
Start: 2022-11-08 | End: 2023-03-01

## 2022-11-08 NOTE — TELEPHONE ENCOUNTER
[facility] is requesting a refill request on vitamin D2 however, I do not see this on her active med list.     Please advise- thanks!

## 2022-11-08 NOTE — TELEPHONE ENCOUNTER
Pt has been informed of her prescription that was sent in and prefers to go [facility] in [place]

## 2022-11-08 NOTE — TELEPHONE ENCOUNTER
Caller: Marion Ro    Relationship: Self    Best call back number: [phone]    What was the call regarding: PATIENT STATED THAT THE ITCHING AND BURNING AROUND HER LIPS IS BEGINNING TO RETURN, AND WOULD LIKE TO KNOW WHAT TO DO, AND HEAR BACK BOUT THESE TEST RESULTS ASAP.    Do you require a callback: PLEASE CALL TO DISCUSS ADVISE

## 2022-11-08 NOTE — TELEPHONE ENCOUNTER
Please advise patient I will be sending in steroids for her to see if its effective  Can you ask her also we discuss this in office to refer to dermatology to see if they may give us more information to help with treatment?

## 2022-11-10 RX ORDER — LEVOTHYROXINE SODIUM 0.07 MG/1
75 TABLET ORAL DAILY
Qty: 90 TABLET | Refills: 1 | Status: SHIPPED | OUTPATIENT
Start: 2022-11-10

## 2022-11-10 NOTE — TELEPHONE ENCOUNTER
90 DAY REQUEST    Rx Refill Note  Requested Prescriptions     Pending Prescriptions Disp Refills   • vitamin D (ERGOCALCIFEROL) 1.25 MG (30499 UT) capsule capsule 8 capsule 0     Sig: Take 1 capsule by mouth Every 7 (Seven) Days.      Last office visit with prescribing clinician: 10/25/2022      Next office visit with prescribing clinician: 12/5/2022            Moon Turner, PCT  11/10/22, 13:06 EST

## 2022-11-11 RX ORDER — ERGOCALCIFEROL 1.25 MG/1
50000 CAPSULE ORAL
Qty: 8 CAPSULE | Refills: 0 | OUTPATIENT
Start: 2022-11-11

## 2022-11-11 NOTE — TELEPHONE ENCOUNTER
Hub is instructed to read the documentation below to patient    Refill request for Vitamin D 1.25mg denied due to duplicate request. Original prescription was sent to [facility] in [place] on 11/8/22 and was confirmed received by pharmacy at 1:10pm. Patient needs to contact pharmacy for  status.

## 2022-12-06 RX ORDER — ESTRADIOL 1 MG/1
TABLET ORAL
Qty: 90 TABLET | Refills: 0 | Status: SHIPPED | OUTPATIENT
Start: 2022-12-06

## 2022-12-06 NOTE — TELEPHONE ENCOUNTER
Rx Refill Note    PROTOCOLS NOT MET     Requested Prescriptions     Pending Prescriptions Disp Refills   • estradiol (ESTRACE) 1 MG tablet [Pharmacy Med Name: ESTRADIOL 1 MG TABLET] 90 tablet 0     Sig: TAKE 1 TABLET BY MOUTH EVERY DAY      Last office visit with prescribing clinician: 10/25/2022      Next office visit with prescribing clinician: Visit date not found            Apolonia Hastings LPN  12/06/22, 08:15 EST

## 2022-12-12 RX ORDER — CHLORTHALIDONE 25 MG/1
TABLET ORAL
Qty: 45 TABLET | Refills: 0 | OUTPATIENT
Start: 2022-12-12

## 2023-01-23 RX ORDER — ZOLMITRIPTAN 5 MG/1
TABLET, FILM COATED ORAL
Qty: 9 TABLET | Refills: 7 | OUTPATIENT
Start: 2023-01-23

## 2023-01-30 RX ORDER — ZOLMITRIPTAN 5 MG/1
TABLET, FILM COATED ORAL
Qty: 9 TABLET | Refills: 7 | OUTPATIENT
Start: 2023-01-30

## 2023-01-30 RX ORDER — CHLORTHALIDONE 25 MG/1
TABLET ORAL
Qty: 45 TABLET | Refills: 0 | OUTPATIENT
Start: 2023-01-30

## 2023-01-31 RX ORDER — CHLORTHALIDONE 25 MG/1
12.5 TABLET ORAL EVERY MORNING
Qty: 45 TABLET | Refills: 0 | Status: SHIPPED | OUTPATIENT
Start: 2023-01-31

## 2023-01-31 RX ORDER — ZOLMITRIPTAN 5 MG/1
5 TABLET, FILM COATED ORAL AS NEEDED
Qty: 36 TABLET | Refills: 1 | Status: SHIPPED | OUTPATIENT
Start: 2023-01-31

## 2023-01-31 NOTE — TELEPHONE ENCOUNTER
Rx Refill Note  Requested Prescriptions     Pending Prescriptions Disp Refills   • ZOLMitriptan (ZOMIG) 5 MG tablet 36 tablet 1     Sig: Take 1 tablet by mouth As Needed for Migraine. for up to one dose.   • chlorthalidone (HYGROTON) 25 MG tablet 45 tablet 0     Sig: Take 0.5 tablets by mouth Every Morning.      Last office visit with prescribing clinician: 10/25/2022   Last telemedicine visit with prescribing clinician: Visit date not found   Next office visit with prescribing clinician: Visit date not found                         Would you like a call back once the refill request has been completed: [] Yes [] No    If the office needs to give you a call back, can they leave a voicemail: [] Yes [] No    Moon Turner, PCT  01/31/23, 09:06 EST

## 2023-03-01 RX ORDER — ERGOCALCIFEROL 1.25 MG/1
CAPSULE ORAL
Qty: 4 CAPSULE | Refills: 1 | Status: SHIPPED | OUTPATIENT
Start: 2023-03-01

## 2023-04-28 RX ORDER — CHLORTHALIDONE 25 MG/1
TABLET ORAL
Qty: 45 TABLET | Refills: 0 | Status: SHIPPED | OUTPATIENT
Start: 2023-04-28

## 2023-05-17 NOTE — TELEPHONE ENCOUNTER
Caller: Marion Ro    Relationship: Self    Best call back number: [phone]  Requested Prescriptions:   Requested Prescriptions     Pending Prescriptions Disp Refills   • estradiol (ESTRACE) 1 MG tablet 90 tablet 0     Sig: Take 1 tablet by mouth Daily.        Pharmacy where request should be sent: [facility]/PHARMACY #[id number] [address] AT [place] [address] - [phone] [facility] [phone]      Last office visit with prescribing clinician: Visit date not found   Last telemedicine visit with prescribing clinician: 4/28/2023   Next office visit with prescribing clinician: Visit date not found     Additional details provided by patient: PATIENT IS OUT OF MEDICATION, WILL NEED FILLED     Does the patient have less than a 3 day supply:  [x] Yes  [] No    Would you like a call back once the refill request has been completed: [] Yes [x] No    If the office needs to give you a call back, can they leave a voicemail: [] Yes [x] No    Bridget Payan   05/17/23 08:34 EDT

## 2023-05-18 RX ORDER — ESTRADIOL 1 MG/1
1 TABLET ORAL DAILY
Qty: 90 TABLET | Refills: 0 | Status: SHIPPED | OUTPATIENT
Start: 2023-05-18

## 2023-06-30 PROBLEM — R73.09 ELEVATED HEMOGLOBIN A1C: Status: ACTIVE | Noted: 2023-06-30

## 2023-06-30 PROBLEM — Z11.59 ENCOUNTER FOR HEPATITIS C SCREENING TEST FOR LOW RISK PATIENT: Status: ACTIVE | Noted: 2023-06-30

## 2023-06-30 PROBLEM — Z00.00 ANNUAL PHYSICAL EXAM: Status: ACTIVE | Noted: 2023-06-30

## 2023-06-30 PROBLEM — Z87.440 HX: UTI (URINARY TRACT INFECTION): Status: ACTIVE | Noted: 2023-06-30

## 2023-06-30 PROBLEM — Z76.89 ESTABLISHING CARE WITH NEW DOCTOR, ENCOUNTER FOR: Status: ACTIVE | Noted: 2023-06-30

## 2023-06-30 PROBLEM — E55.9 VITAMIN D DEFICIENCY: Status: ACTIVE | Noted: 2023-06-30

## 2023-06-30 PROBLEM — E78.00 PURE HYPERCHOLESTEROLEMIA: Status: ACTIVE | Noted: 2023-06-30

## 2023-07-27 DIAGNOSIS — E55.9 VITAMIN D DEFICIENCY: ICD-10-CM

## 2023-07-27 RX ORDER — ERGOCALCIFEROL 1.25 MG/1
CAPSULE ORAL
Qty: 12 CAPSULE | Refills: 3 | Status: SHIPPED | OUTPATIENT
Start: 2023-07-27

## 2023-08-31 ENCOUNTER — TELEPHONE (OUTPATIENT)
Dept: FAMILY MEDICINE CLINIC | Facility: CLINIC | Age: 63
End: 2023-08-31

## 2023-08-31 NOTE — TELEPHONE ENCOUNTER
Please advise her to take the other half of the tablet.  She can start taking 2 of these in the morning and lets see if we can get her on the schedule next week.  If blood pressure remains elevated, I do recommend her to go to urgent care

## 2023-08-31 NOTE — TELEPHONE ENCOUNTER
Attempting to contact pt back, no answer, lmom    Hub to read   Please advise her to take the other half of the tablet.  She can start taking 2 of these in the morning and lets see if we can get her on the schedule next week.  If blood pressure remains elevated, I do recommend her to go to urgent care

## 2023-08-31 NOTE — TELEPHONE ENCOUNTER
Caller: Marion Ro    Relationship to patient: Self    Best call back number: [phone]    Patient is needing: PATIENT CALLED AND SAID THAT SHE IS EXPERIENCING HIGH BLOOD PRESSURE WHILE ON HER BLOOD PRESSURE MEDICATION, AND SHE DID TAKE ANOTHER HALF A TABLET OF IT TO TRY AND BRING IT DOWN BUT IT DID NOT WORK    SHE SAID SHE CHECKED IT TODAY, 08/31/23 AND IT /96    PATIENT ASKED TO BE SEEN TODAY, HUB DID NOT SEE ANY OPENINGS, ATTEMPTED WARM TRANSFER AND UNABLE TO DO SO

## 2023-09-01 ENCOUNTER — OFFICE VISIT (OUTPATIENT)
Dept: FAMILY MEDICINE CLINIC | Facility: CLINIC | Age: 63
End: 2023-09-01
Payer: COMMERCIAL

## 2023-09-01 VITALS
SYSTOLIC BLOOD PRESSURE: 120 MMHG | WEIGHT: 146 LBS | HEIGHT: 62 IN | BODY MASS INDEX: 26.87 KG/M2 | HEART RATE: 87 BPM | DIASTOLIC BLOOD PRESSURE: 72 MMHG | TEMPERATURE: 97.9 F | OXYGEN SATURATION: 99 %

## 2023-09-01 DIAGNOSIS — K59.09 CHRONIC CONSTIPATION: Primary | ICD-10-CM

## 2023-09-01 DIAGNOSIS — I10 PRIMARY HYPERTENSION: ICD-10-CM

## 2023-09-01 PROCEDURE — 99213 OFFICE O/P EST LOW 20 MIN: CPT | Performed by: NURSE PRACTITIONER

## 2023-09-01 RX ORDER — MELOXICAM 7.5 MG/1
1 TABLET ORAL DAILY
COMMUNITY
Start: 2023-08-08

## 2023-09-01 RX ORDER — CEFDINIR 300 MG/1
CAPSULE ORAL
COMMUNITY

## 2023-09-01 NOTE — PROGRESS NOTES
"Chief Complaint  Hypertension    Subjective          Marion Ro presents to [facility]      History of Present Illness    Jessica is a 62-year-old female patient who presents today to follow-up on hypertension.    Reports 2 days ago she was at work and checked her blood pressure and it was in the 140/80 range.  She did not feel unwell and was asymptomatic.  She does report this morning her automatic wrist cuff read a blood pressure of 144/102 before her chlorthalidone.  She took a whole tablet 25 mg, although she is prescribed 12.5 mg daily.  Blood pressure went down to 125/92.  She states right before coming in she checked her blood pressure again and it was 140/90.      She was placed on meloxicam by another provider.  She quit taking it about 3 or 4 days ago but she read online that it could cause blood pressure elevations.  She been taking it for a few weeks prior to this.  Blood pressure in office today is 128/80.  I did check after about 10 minutes of conversation and blood pressure went down to 120/72.  She is without any chest pain, headaches, visual disturbances.    She does have a history of chronic constipation.  She currently takes stool softeners.  She is wondering if there is something else she can try for this.  As the stool softeners are not really moving things along    Objective     Vital Signs:   /72   Pulse 87   Temp 97.9 øF (36.6 øC) (Infrared)   Ht 157.5 cm (62.01\")   Wt 66.2 kg (146 lb)   SpO2 99%   BMI 26.70 kg/mý           Physical Exam  Vitals reviewed.   Constitutional:       Appearance: She is well-developed.      Comments:      HENT:      Head: Normocephalic and atraumatic.      Nose: Nose normal.      Mouth/Throat:      Mouth: Mucous membranes are moist.      Pharynx: Oropharynx is clear.   Eyes:      Conjunctiva/sclera: Conjunctivae normal.      Pupils: Pupils are equal, round, and reactive to light.   Cardiovascular:      Rate and " Rhythm: Normal rate and regular rhythm.      Pulses: Normal pulses.      Heart sounds: Normal heart sounds. No murmur heard.  Pulmonary:      Effort: Pulmonary effort is normal. No respiratory distress.      Breath sounds: Normal breath sounds.   Abdominal:      General: Abdomen is flat. Bowel sounds are decreased.      Tenderness: There is no right CVA tenderness, left CVA tenderness, guarding or rebound. Negative signs include Carbajal's sign, Rovsing's sign, McBurney's sign and psoas sign.      Comments: Decreased all 4 quadrants   Musculoskeletal:         General: Normal range of motion.      Cervical back: Normal range of motion.   Skin:     General: Skin is warm and dry.      Findings: No rash.   Neurological:      Mental Status: She is alert and oriented to person, place, and time.   Psychiatric:         Behavior: Behavior normal.              Result Review :                                   Assessment and Plan      Diagnoses and all orders for this visit:    1. Chronic constipation (Primary)  -     linaclotide (Linzess) 72 MCG capsule capsule; Take 1 capsule by mouth Every Morning Before Breakfast.  Dispense: 8 capsule; Refill: 0    2. Primary hypertension      Blood pressure looks well today.  I do not see any reason to make any adjustment to her antihypertensives.  I advised her to go back to her 12.5 mg dose of chlorthalidone and to monitor blood pressure daily until next Friday.  I would like her to reach back out to me to let me know what those readings are.  If they are remaining elevated it is possible we should increase to 25 mg daily.  Patient did tolerate that dose today.    Regarding constipation gave her sample of Linzess to trial.  Patient will let me know how this goes for her.            Follow Up       No follow-ups on file.      Patient was given instructions and counseling regarding her condition or for health maintenance advice. Please see specific information pulled into the AVS if  appropriate.     She Turner, APRN9/1/202312:07 EDT  This note has been electronically signed    Answers submitted by the patient for this visit:  Primary Reason for Visit (Submitted on 8/31/2023)  What is the primary reason for your visit?: High Blood Pressure

## 2023-09-08 DIAGNOSIS — K59.09 CHRONIC CONSTIPATION: ICD-10-CM

## 2023-09-08 NOTE — TELEPHONE ENCOUNTER
Caller: Marion Ro    Relationship: Self    Best call back number: [phone]     Requested Prescriptions:   Requested Prescriptions     Pending Prescriptions Disp Refills    linaclotide (Linzess) 72 MCG capsule capsule 8 capsule 0     Sig: Take 1 capsule by mouth Every Morning Before Breakfast.        Pharmacy where request should be sent: [facility]/PHARMACY #[id number] [address] AT [place] [address] - [phone] [facility] [phone] FX     Last office visit with prescribing clinician: 9/1/2023   Last telemedicine visit with prescribing clinician: Visit date not found   Next office visit with prescribing clinician: Visit date not found     Additional details provided by patient:     Does the patient have less than a 3 day supply:  [x] Yes  [] No    Would you like a call back once the refill request has been completed: [] Yes [] No    If the office needs to give you a call back, can they leave a voicemail: [] Yes [] No    PLEASE ADVISE.    Andrei Pelaez Rep   09/08/23 08:29 EDT

## 2023-12-08 DIAGNOSIS — E03.9 ACQUIRED HYPOTHYROIDISM: ICD-10-CM

## 2023-12-08 DIAGNOSIS — K59.09 CHRONIC CONSTIPATION: ICD-10-CM

## 2023-12-08 RX ORDER — LEVOTHYROXINE SODIUM 0.07 MG/1
75 TABLET ORAL DAILY
Qty: 90 TABLET | Refills: 1 | Status: SHIPPED | OUTPATIENT
Start: 2023-12-08

## 2023-12-08 RX ORDER — CHLORTHALIDONE 25 MG/1
12.5 TABLET ORAL EVERY MORNING
Qty: 45 TABLET | Refills: 3 | Status: SHIPPED | OUTPATIENT
Start: 2023-12-08

## 2023-12-08 RX ORDER — ZOLMITRIPTAN 5 MG/1
5 TABLET, FILM COATED ORAL AS NEEDED
Qty: 36 TABLET | Refills: 1 | Status: SHIPPED | OUTPATIENT
Start: 2023-12-08

## 2023-12-08 RX ORDER — LINACLOTIDE 72 UG/1
72 CAPSULE, GELATIN COATED ORAL
Qty: 90 CAPSULE | Refills: 0 | Status: SHIPPED | OUTPATIENT
Start: 2023-12-08

## 2023-12-08 NOTE — TELEPHONE ENCOUNTER
Caller: Marion Ro    Relationship: Self    Best call back number: [phone]     Requested Prescriptions:   Requested Prescriptions     Pending Prescriptions Disp Refills    ZOLMitriptan (ZOMIG) 5 MG tablet 36 tablet 1     Sig: Take 1 tablet by mouth As Needed for Migraine. for up to one dose.    levothyroxine (SYNTHROID, LEVOTHROID) 75 MCG tablet 90 tablet 1     Sig: Take 1 tablet by mouth Daily. No further refills until seen in office with fasting blood work    chlorthalidone (HYGROTON) 25 MG tablet 45 tablet 3     Sig: Take 0.5 tablets by mouth Every Morning.        Pharmacy where request should be sent: [facility]/PHARMACY #[id number] - [address] AT [place] [address] - [phone] [place] [phone]      Last office visit with prescribing clinician: 9/1/2023   Last telemedicine visit with prescribing clinician: Visit date not found   Next office visit with prescribing clinician: Visit date not found     Additional details provided by patient:     Does the patient have less than a 3 day supply:  [] Yes  [x] No    Would you like a call back once the refill request has been completed: [x] Yes [] No    If the office needs to give you a call back, can they leave a voicemail: [x] Yes [] No    Andrei Barron Rep   12/08/23 10:46 EST

## 2024-03-26 DIAGNOSIS — K59.09 CHRONIC CONSTIPATION: ICD-10-CM

## 2024-03-26 RX ORDER — LINACLOTIDE 72 UG/1
72 CAPSULE, GELATIN COATED ORAL
Qty: 90 CAPSULE | Refills: 3 | Status: SHIPPED | OUTPATIENT
Start: 2024-03-26

## 2024-04-10 ENCOUNTER — TELEPHONE (OUTPATIENT)
Dept: FAMILY MEDICINE CLINIC | Facility: CLINIC | Age: 64
End: 2024-04-10
Payer: COMMERCIAL

## 2024-04-10 NOTE — TELEPHONE ENCOUNTER
Caller: Marion Ro    Relationship: Self    Best call back number: [phone]    What medication are you requesting: STEROIDS     What are your current symptoms: ALLERGIC REACTION, RASH     Have you had these symptoms before:    [x] Yes  [] No    Have you been treated for these symptoms before:   [x] Yes  [] No    If a prescription is needed, what is your preferred pharmacy and phone number: [facility]/PHARMACY #[id number] - [address] AT [place] [address] - [phone] [facility] [phone]      Additional notes:PATIENT STATES SHE HAS HAD REACTION BEFORE AND WAS PRESCRIBED STEROIDS. PATIENT STATES HISTORY SHOULD BE IN HER CHART

## 2024-04-10 NOTE — TELEPHONE ENCOUNTER
SPOKE WITH PT AND LET HER KNOW THAT [facility] IS OUT OF OFFICE AND WOULD NEED TO BE SEEN, PT HAS APPT ON TUESDAY AND WILL TRY TO WAIT TILL THEN AND IF GETS WORSE WILL GO TO AN URGENT CARE

## 2024-04-16 ENCOUNTER — OFFICE VISIT (OUTPATIENT)
Dept: FAMILY MEDICINE CLINIC | Facility: CLINIC | Age: 64
End: 2024-04-16
Payer: COMMERCIAL

## 2024-04-16 VITALS
OXYGEN SATURATION: 98 % | SYSTOLIC BLOOD PRESSURE: 143 MMHG | HEIGHT: 62 IN | WEIGHT: 150 LBS | TEMPERATURE: 97.3 F | BODY MASS INDEX: 27.6 KG/M2 | HEART RATE: 76 BPM | DIASTOLIC BLOOD PRESSURE: 89 MMHG

## 2024-04-16 DIAGNOSIS — E55.9 VITAMIN D DEFICIENCY: ICD-10-CM

## 2024-04-16 DIAGNOSIS — G25.81 RLS (RESTLESS LEGS SYNDROME): ICD-10-CM

## 2024-04-16 DIAGNOSIS — E78.00 PURE HYPERCHOLESTEROLEMIA: Primary | ICD-10-CM

## 2024-04-16 DIAGNOSIS — E66.3 OVERWEIGHT (BMI 25.0-29.9): ICD-10-CM

## 2024-04-16 DIAGNOSIS — R73.09 ELEVATED HEMOGLOBIN A1C: ICD-10-CM

## 2024-04-16 DIAGNOSIS — I10 PRIMARY HYPERTENSION: ICD-10-CM

## 2024-04-16 DIAGNOSIS — E03.9 ACQUIRED HYPOTHYROIDISM: ICD-10-CM

## 2024-04-16 PROBLEM — Z00.00 ADULT GENERAL MEDICAL EXAMINATION: Status: RESOLVED | Noted: 2017-04-04 | Resolved: 2024-04-16

## 2024-04-16 PROBLEM — H61.21 IMPACTED CERUMEN OF RIGHT EAR: Status: RESOLVED | Noted: 2017-02-19 | Resolved: 2024-04-16

## 2024-04-16 PROBLEM — H65.90 SEROUS OTITIS MEDIA: Status: RESOLVED | Noted: 2017-02-16 | Resolved: 2024-04-16

## 2024-04-16 PROBLEM — Z12.31 OTHER SCREENING MAMMOGRAM: Status: RESOLVED | Noted: 2017-01-23 | Resolved: 2024-04-16

## 2024-04-16 PROBLEM — Z11.59 ENCOUNTER FOR HEPATITIS C SCREENING TEST FOR LOW RISK PATIENT: Status: RESOLVED | Noted: 2023-06-30 | Resolved: 2024-04-16

## 2024-04-16 PROBLEM — N39.0 URINARY TRACT INFECTION: Status: RESOLVED | Noted: 2018-12-12 | Resolved: 2024-04-16

## 2024-04-16 PROCEDURE — 99214 OFFICE O/P EST MOD 30 MIN: CPT | Performed by: NURSE PRACTITIONER

## 2024-04-16 NOTE — PROGRESS NOTES
"Chief Complaint  Hypothyroidism and Hypertension        Marion Ro presents to [facility] INTERNAL MEDICINE        Subjective      63 year old female patient who presents today to follow up on HTN and hypothyroidism.     HTN - blood pressure stable. Asymptomatic. Chorthalidone 12.5 mg daily. Often runs 130/80 at home.     Hypothyroidism - taking synthroid 75 mcg daily. Stable for a while. No palpitations.     She has been with a rash around mouth three or four times. She was recently treated with steroid and only one other occasion before. It cleared up both times. Lips ar severely dry.     History of RLS takes over-the-counter magnesium supplementation.    Due for colonoscopy. Dr. Jauregui.  History of  total hysterectomy. No need for paps anymore.     Previously followed urology.  Has not seen in 9 years. Has upcoming appt. history of UTIs, incomplete bladder emptying.                    Objective         Vital Signs:     /89 (BP Location: Right arm, Patient Position: Sitting, Cuff Size: Adult)   Pulse 76   Temp 97.3 °F (36.3 °C) (Infrared)   Ht 157.5 cm (62.01\")   Wt 68 kg (150 lb)   SpO2 98%   BMI 27.43 kg/m²       Physical Exam  Vitals reviewed.   Constitutional:       Appearance: She is well-developed.      Comments:      HENT:      Head: Normocephalic and atraumatic.      Nose: Nose normal.      Mouth/Throat:      Mouth: Mucous membranes are moist.      Pharynx: Oropharynx is clear.   Eyes:      Conjunctiva/sclera: Conjunctivae normal.      Pupils: Pupils are equal, round, and reactive to light.   Cardiovascular:      Rate and Rhythm: Normal rate and regular rhythm.      Pulses: Normal pulses.      Heart sounds: Normal heart sounds.   Pulmonary:      Effort: Pulmonary effort is normal. No respiratory distress.      Breath sounds: Normal breath sounds. No stridor. No wheezing, rhonchi or rales.   Chest:      Chest wall: No tenderness.   Musculoskeletal:         General: " Normal range of motion.      Cervical back: Normal range of motion.   Skin:     General: Skin is warm and dry.      Findings: No rash.   Neurological:      Mental Status: She is alert and oriented to person, place, and time.   Psychiatric:         Behavior: Behavior normal.                History of Present Illness      Patient Active Problem List   Diagnosis    Allergic rhinitis    Cyst of meniscus of knee    Elevated blood pressure reading without diagnosis of hypertension    Hip pain    Hypothyroidism    Knee pain, right    Lipid screening    Lipoma of breast    Migraine headache    Osteoarthritis    Other specified personal risk factors, not elsewhere classified    Palpitations    Bilateral tinnitus    Chronic sinusitis    Heart burn    Vaginal vault prolapse    Swelling of extremity    RLS (restless legs syndrome)    Perennial allergic rhinitis with seasonal variation    Osteoarthritis of right knee    Obstruction of bladder neck    Incomplete emptying of bladder    Incomplete defecation    Combined forms of age-related cataract of both eyes    Nuclear senile cataract    Presbyopia    Regular astigmatism of both eyes    Establishing care with new doctor, encounter for    Annual physical exam    Pure hypercholesterolemia    Elevated hemoglobin A1c    Vitamin D deficiency    Hx: UTI (urinary tract infection)    Primary hypertension    BMI 27.0-27.9,adult    Overweight (BMI 25.0-29.9)         Past Medical History:   Diagnosis Date    Adult general medical examination 04/04/2017    Colon polyp 2017    One small    Encounter for hepatitis C screening test for low risk patient 06/30/2023    General symptom 08/04/2015    Headache     Zolmatriptan as needed    Hypertension     Hypothyroidism     Impacted cerumen of right ear 02/19/2017    Other screening mammogram 01/23/2017    Serous otitis media 02/16/2017    Urinary tract infection 12/12/2018          Family History   Problem Relation Age of Onset    Lung cancer  Mother     Arthritis Mother     Cancer Mother          of cancer    COPD Mother     Depression Mother     Heart disease Mother     Hyperlipidemia Mother     Hypertension Mother     Stroke Mother     Alcohol abuse Father     COPD Brother     Heart block Brother     COPD Brother     Heart block Brother     No Known Problems Daughter     No Known Problems Son     No Known Problems Maternal Grandmother     Alzheimer's disease Maternal Grandfather     Dementia Maternal Grandfather           Past Surgical History:   Procedure Laterality Date    BILATERAL BREAST REDUCTION      BONY PELVIS SURGERY      BREAST SURGERY      Reduction    COLONOSCOPY  2017    polyps- negative    HYSTERECTOMY      JOINT REPLACEMENT  2014    Partial right knee    KNEE ARTHROPLASTY, PARTIAL REPLACEMENT Right     THYROID CYST EXCISION      TUBAL ABDOMINAL LIGATION   ?          Social History     Socioeconomic History    Marital status:    Tobacco Use    Smoking status: Never    Smokeless tobacco: Never   Vaping Use    Vaping status: Never Used   Substance and Sexual Activity    Alcohol use: Never    Drug use: Never    Sexual activity: Yes     Partners: Male     Birth control/protection: Tubal ligation, Hysterectomy, Surgical                    Result Review :                                       Assessment and Plan      Diagnoses and all orders for this visit:    1. Pure hypercholesterolemia (Primary)  -     Lipid Panel With LDL / HDL Ratio    2. Acquired hypothyroidism  -     Iron and TIBC  -     Ferritin  -     TSH    3. Vitamin D deficiency  -     Vitamin B12  -     Vitamin D,25-Hydroxy  -     Vitamin B1, Whole Blood    4. Elevated hemoglobin A1c  -     Hemoglobin A1c    5. Primary hypertension  -     CBC & Differential  -     Comprehensive metabolic panel    6. RLS (restless legs syndrome)  -     Magnesium    7. BMI 27.0-27.9,adult  -     Vitamin B1, Whole Blood    8. Overweight (BMI 25.0-29.9)  Assessment & Plan:  Patient's  (Body mass index is 27.43 kg/m².) indicates that they are overweight with health conditions that include hypertension . Weight is newly identified. BMI is is above average; BMI management plan is completed. We discussed portion control and increasing exercise.     Orders:  -     Vitamin B1, Whole Blood        Blood pressure stable at home.  She is asymptomatic with thyroid.  She is worried about a rash that continues to recur around the lips and mouth region intermittently.  She has good resolution with steroid treatment.  Offered to refer to allergy and immunology and patient declined at this time as no rash present.  Will go ahead and check labs.  She has an underlying history of vitamin D deficiency add a past history of vitamin B deficiency.  Has history of RLS but takes over-the-counter supplementation would like to see what her magnesium level as well as magnesium is in the supplement.                        Follow Up       No follow-ups on file.      Patient was given instructions and counseling regarding her condition or for health maintenance advice. Please see specific information pulled into the AVS if appropriate.     She Turner, APRN4/16/202410:35 EDT  This note has been electronically signed

## 2024-04-17 LAB
25(OH)D3+25(OH)D2 SERPL-MCNC: 54.3 NG/ML (ref 30–100)
ALBUMIN SERPL-MCNC: 3.8 G/DL (ref 3.9–4.9)
ALBUMIN/GLOB SERPL: 1.4 {RATIO} (ref 1.2–2.2)
ALP SERPL-CCNC: 92 IU/L (ref 44–121)
ALT SERPL-CCNC: 12 IU/L (ref 0–32)
AST SERPL-CCNC: 6 IU/L (ref 0–40)
BASOPHILS # BLD AUTO: 0.1 X10E3/UL (ref 0–0.2)
BASOPHILS NFR BLD AUTO: 1 %
BILIRUB SERPL-MCNC: 0.3 MG/DL (ref 0–1.2)
BUN SERPL-MCNC: 14 MG/DL (ref 8–27)
BUN/CREAT SERPL: 19 (ref 12–28)
CALCIUM SERPL-MCNC: 9.5 MG/DL (ref 8.7–10.3)
CHLORIDE SERPL-SCNC: 98 MMOL/L (ref 96–106)
CHOLEST SERPL-MCNC: 187 MG/DL (ref 100–199)
CO2 SERPL-SCNC: 28 MMOL/L (ref 20–29)
CREAT SERPL-MCNC: 0.74 MG/DL (ref 0.57–1)
EGFRCR SERPLBLD CKD-EPI 2021: 91 ML/MIN/1.73
EOSINOPHIL # BLD AUTO: 0.3 X10E3/UL (ref 0–0.4)
EOSINOPHIL NFR BLD AUTO: 3 %
ERYTHROCYTE [DISTWIDTH] IN BLOOD BY AUTOMATED COUNT: 12.9 % (ref 11.7–15.4)
FERRITIN SERPL-MCNC: 62 NG/ML (ref 15–150)
GLOBULIN SER CALC-MCNC: 2.7 G/DL (ref 1.5–4.5)
GLUCOSE SERPL-MCNC: 90 MG/DL (ref 70–99)
HBA1C MFR BLD: 6.1 % (ref 4.8–5.6)
HCT VFR BLD AUTO: 47.7 % (ref 34–46.6)
HDLC SERPL-MCNC: 63 MG/DL
HGB BLD-MCNC: 15.2 G/DL (ref 11.1–15.9)
IMM GRANULOCYTES # BLD AUTO: 0 X10E3/UL (ref 0–0.1)
IMM GRANULOCYTES NFR BLD AUTO: 0 %
IRON SATN MFR SERPL: 37 % (ref 15–55)
IRON SERPL-MCNC: 110 UG/DL (ref 27–139)
LDLC SERPL CALC-MCNC: 97 MG/DL (ref 0–99)
LDLC/HDLC SERPL: 1.5 RATIO (ref 0–3.2)
LYMPHOCYTES # BLD AUTO: 2.5 X10E3/UL (ref 0.7–3.1)
LYMPHOCYTES NFR BLD AUTO: 25 %
MAGNESIUM SERPL-MCNC: 1.9 MG/DL (ref 1.6–2.3)
MCH RBC QN AUTO: 29.4 PG (ref 26.6–33)
MCHC RBC AUTO-ENTMCNC: 31.9 G/DL (ref 31.5–35.7)
MCV RBC AUTO: 92 FL (ref 79–97)
MONOCYTES # BLD AUTO: 1 X10E3/UL (ref 0.1–0.9)
MONOCYTES NFR BLD AUTO: 10 %
NEUTROPHILS # BLD AUTO: 6.3 X10E3/UL (ref 1.4–7)
NEUTROPHILS NFR BLD AUTO: 61 %
PLATELET # BLD AUTO: 357 X10E3/UL (ref 150–450)
POTASSIUM SERPL-SCNC: 4.1 MMOL/L (ref 3.5–5.2)
PROT SERPL-MCNC: 6.5 G/DL (ref 6–8.5)
RBC # BLD AUTO: 5.17 X10E6/UL (ref 3.77–5.28)
SODIUM SERPL-SCNC: 140 MMOL/L (ref 134–144)
TIBC SERPL-MCNC: 300 UG/DL (ref 250–450)
TRIGL SERPL-MCNC: 157 MG/DL (ref 0–149)
TSH SERPL DL<=0.005 MIU/L-ACNC: 3 UIU/ML (ref 0.45–4.5)
UIBC SERPL-MCNC: 190 UG/DL (ref 118–369)
VIT B12 SERPL-MCNC: 559 PG/ML (ref 232–1245)
VLDLC SERPL CALC-MCNC: 27 MG/DL (ref 5–40)
WBC # BLD AUTO: 10.2 X10E3/UL (ref 3.4–10.8)

## 2024-04-18 NOTE — PROGRESS NOTES
A1c is 6.1 stable for almost the past year.  Lipid panel looks good.  Thyroid panel great vitamin D, B12 all normal.  Awaiting B1 results.

## 2024-04-19 LAB — VIT B1 BLD-SCNC: 150.1 NMOL/L (ref 66.5–200)

## 2024-04-22 DIAGNOSIS — L71.0 PERIORAL DERMATITIS: Primary | ICD-10-CM

## 2024-04-22 RX ORDER — METHYLPREDNISOLONE 4 MG/1
TABLET ORAL
Qty: 21 EACH | Refills: 0 | Status: SHIPPED | OUTPATIENT
Start: 2024-04-22 | End: 2024-04-27

## 2024-05-26 DIAGNOSIS — E03.9 ACQUIRED HYPOTHYROIDISM: ICD-10-CM

## 2024-05-28 RX ORDER — LEVOTHYROXINE SODIUM 0.07 MG/1
75 TABLET ORAL DAILY
Qty: 90 TABLET | Refills: 1 | Status: SHIPPED | OUTPATIENT
Start: 2024-05-28

## 2024-06-26 DIAGNOSIS — E55.9 VITAMIN D DEFICIENCY: ICD-10-CM

## 2024-06-28 RX ORDER — ERGOCALCIFEROL 1.25 MG/1
50000 CAPSULE ORAL
Qty: 12 CAPSULE | Refills: 3 | Status: SHIPPED | OUTPATIENT
Start: 2024-06-28

## 2024-07-01 RX ORDER — ZOLMITRIPTAN 5 MG/1
5 TABLET, FILM COATED ORAL AS NEEDED
Qty: 12 TABLET | Refills: 5 | Status: SHIPPED | OUTPATIENT
Start: 2024-07-01

## 2024-07-29 RX ORDER — ESTRADIOL 1 MG/1
1 TABLET ORAL DAILY
Qty: 90 TABLET | Refills: 0 | Status: SHIPPED | OUTPATIENT
Start: 2024-07-29

## 2024-08-28 NOTE — ASSESSMENT & PLAN NOTE
"Subjective:   Patient ID: Shannon Panchal is a 15 y.o. female.    [facility] Clinical Progress Notes      Specialized Services Documentation  Therapist must complete separate progress note for each specific clinical activity in which the individual participated during the day.     Allied Therapy   Visit Start Time: 1200  Visit Stop Time: 1300  Total Visit Duration: 60 minutes  Shannon Panchal shared in MTH group focused on understanding the difference between guilt and shame. Shannon Panchal actively participated in song discussion on \"Older\" by Castro See relating to regrets and guilt. Group engaged in discussion on understanding healthy vs unhealthy guilt and shame. Group watched MARLON talk by Salma San on guilt and shame. Shannon Panchal shared the difference between guilt and shame as something they learned or took away from the group. Shannon Panchal will work on taking responsibility for her actions and seeking relationships to practice coping with guilt or shame. Some progress noted toward treatment goals. Continue AT to develop understanding and practice of differentiating guilt vs shame.     Tx Plan Objective: 1.1,1.2,1.4, Therapist:  MACKENZIE Collins    Education Therapy   Visit Start Time: 1545  Visit Stop Time: 1615  Total Visit Duration: 30 minutes  Shannon Panchal engaged throughout the treatment day. Was engaged in learning related to Illness, Medication, Aftercare, and Wellness Tools. Staff utilized Verbal, Written, A/V, and Demonstration teaching methods.  Shannon Panchal shared area of learning and set a goal for outside of program to take one shower, wanting to gain responsibility.      Tx Plan Objective: 1.1,1.2,1.4, Therapist:  MACKENZIE Collins  " Patient's (Body mass index is 27.43 kg/m².) indicates that they are overweight with health conditions that include hypertension . Weight is newly identified. BMI is is above average; BMI management plan is completed. We discussed portion control and increasing exercise.

## 2024-09-30 ENCOUNTER — OFFICE VISIT (OUTPATIENT)
Dept: FAMILY MEDICINE CLINIC | Facility: CLINIC | Age: 64
End: 2024-09-30
Payer: COMMERCIAL

## 2024-09-30 ENCOUNTER — PATIENT ROUNDING (BHMG ONLY) (OUTPATIENT)
Dept: FAMILY MEDICINE CLINIC | Facility: CLINIC | Age: 64
End: 2024-09-30
Payer: COMMERCIAL

## 2024-09-30 VITALS
OXYGEN SATURATION: 98 % | SYSTOLIC BLOOD PRESSURE: 145 MMHG | HEIGHT: 62 IN | TEMPERATURE: 98.3 F | BODY MASS INDEX: 27.6 KG/M2 | RESPIRATION RATE: 14 BRPM | HEART RATE: 86 BPM | DIASTOLIC BLOOD PRESSURE: 83 MMHG | WEIGHT: 150 LBS

## 2024-09-30 DIAGNOSIS — E03.9 HYPOTHYROIDISM, UNSPECIFIED TYPE: ICD-10-CM

## 2024-09-30 DIAGNOSIS — Z12.11 COLON CANCER SCREENING: ICD-10-CM

## 2024-09-30 DIAGNOSIS — Z71.82 EXERCISE COUNSELING: ICD-10-CM

## 2024-09-30 DIAGNOSIS — Z00.00 ANNUAL PHYSICAL EXAM: Primary | ICD-10-CM

## 2024-09-30 DIAGNOSIS — I10 PRIMARY HYPERTENSION: ICD-10-CM

## 2024-09-30 DIAGNOSIS — Z90.710 H/O TOTAL HYSTERECTOMY: ICD-10-CM

## 2024-09-30 DIAGNOSIS — Z71.3 DIETARY COUNSELING: ICD-10-CM

## 2024-09-30 DIAGNOSIS — R73.03 PRE-DIABETES: ICD-10-CM

## 2024-09-30 DIAGNOSIS — Z12.31 ENCOUNTER FOR SCREENING MAMMOGRAM FOR MALIGNANT NEOPLASM OF BREAST: ICD-10-CM

## 2024-09-30 DIAGNOSIS — G43.809 OTHER MIGRAINE WITHOUT STATUS MIGRAINOSUS, NOT INTRACTABLE: ICD-10-CM

## 2024-09-30 NOTE — PROGRESS NOTES
"Chief Complaint  Establish Care    Subjective    History of Present Illness {  Problem List  Visit  Diagnosis   Encounters  Notes  Medications  Labs  Result Review Imaging  Media :23}     Marion Ro presents to [facility] for [facility].      History of Present Illness     63 YOF presents to [facility]. She is agreeable for annual exam today. History of hypothyroidism, HTN, migraines, prediabetes.    -Last thyroid function was normal. She is on levothyroxine 75 mcg. Patient says she has felt at baseline. No increased fatigue, no unexpected weight gain, no cold intolerance, no dry hair/skin.     -HTN - Blood pressure is 148/91, 145/86  in the office. She is taking Chlorthalidone 25 mg x 1/2 tablet daily. She denies any associated symptoms. She denies headaches, blurred vision, anxiety, chest pain, SOB, palpitations. She was elevated around the same place at her last primary care visit back in April of this year.      -Migraines occur around 3  times per month at this time. She uses Excedrin and Zolmitriptan for treatment of migraines.     -Last hemoglobin 4/16/2024 A1c=6.1    -Last lipid panel 4/16/2024 shows elevated triglycerides (157) with normal total cholesterol, normal LDL cholesterol, normal HDL cholesterol.     -Last mammogram was in 2022.     -Hysterectomy in 2015    -Colon cancer screening: Colonoscopy in 2017 recommended follow up in 5 years.     -No smoking history.     Objective     Vital Signs:   /83   Pulse 86   Temp 98.3 °F (36.8 °C) (Oral)   Resp 14   Ht 157.5 cm (62.01\")   Wt 68 kg (150 lb)   SpO2 98%   BMI 27.43 kg/m²   Current Outpatient Medications on File Prior to Visit   Medication Sig Dispense Refill    chlorthalidone (HYGROTON) 25 MG tablet Take 0.5 tablets by mouth Every Morning. 45 tablet 3    estradiol (ESTRACE) 1 MG tablet TAKE 1 TABLET BY MOUTH EVERY DAY 90 tablet 0    levothyroxine (SYNTHROID, LEVOTHROID) 75 MCG " tablet TAKE 1 TABLET BY MOUTH DAILY. NO FURTHER REFILLS UNTIL SEEN IN OFFICE WITH FASTING BLOOD WORK 90 tablet 1    linaclotide (Linzess) 72 MCG capsule capsule TAKE 1 CAPSULE BY MOUTH EVERY DAY IN THE MORNING BEFORE BREAKFAST 90 capsule 3    Potassium 99 MG tablet Take 1 tablet by mouth Daily.      Probiotic Product (PROBIOTIC DAILY PO) Take 1 capsule by mouth Daily.      vitamin D (ERGOCALCIFEROL) 1.25 MG (09476 UT) capsule capsule TAKE 1 CAPSULE BY MOUTH ONE TIME PER WEEK 12 capsule 3    ZOLMitriptan (ZOMIG) 5 MG tablet TAKE 1 TABLET BY MOUTH AS NEEDED FOR MIGRAINE. FOR UP TO ONE DOSE. 12 tablet 5     No current facility-administered medications on file prior to visit.        Past Medical History:   Diagnosis Date    Adult general medical examination 2017    Colon polyp     One small    Encounter for hepatitis C screening test for low risk patient 2023    General symptom 2015    Headache     Zolmatriptan as needed    Hypertension     Hypothyroidism     Impacted cerumen of right ear 2017    Other screening mammogram 2017    Serous otitis media 2017    Urinary tract infection 2018      Past Surgical History:   Procedure Laterality Date    BILATERAL BREAST REDUCTION      BONY PELVIS SURGERY      BREAST SURGERY      Reduction    COLONOSCOPY      polyps- negative    HYSTERECTOMY      JOINT REPLACEMENT  2014    Partial right knee    KNEE ARTHROPLASTY, PARTIAL REPLACEMENT Right     THYROID CYST EXCISION      TUBAL ABDOMINAL LIGATION   ?      Family History   Problem Relation Age of Onset    Lung cancer Mother     Arthritis Mother     Cancer Mother          of cancer    COPD Mother     Depression Mother     Heart disease Mother     Hyperlipidemia Mother     Hypertension Mother     Stroke Mother     Alcohol abuse Father     COPD Brother     Heart block Brother     COPD Brother     Heart block Brother     No Known Problems Daughter     No Known Problems Son      No Known Problems Maternal Grandmother     Alzheimer's disease Maternal Grandfather     Dementia Maternal Grandfather       Social History     Socioeconomic History    Marital status:    Tobacco Use    Smoking status: Never     Passive exposure: Never    Smokeless tobacco: Never   Vaping Use    Vaping status: Never Used   Substance and Sexual Activity    Alcohol use: Never    Drug use: Never    Sexual activity: Yes     Partners: Male     Birth control/protection: Tubal ligation, Hysterectomy, Surgical         No visits with results within 3 Month(s) from this visit.   Latest known visit with results is:   Office Visit on 04/16/2024   Component Date Value Ref Range Status    WBC 04/16/2024 10.2  3.4 - 10.8 x10E3/uL Final    RBC 04/16/2024 5.17  3.77 - 5.28 x10E6/uL Final    Hemoglobin 04/16/2024 15.2  11.1 - 15.9 g/dL Final    Hematocrit 04/16/2024 47.7 (H)  34.0 - 46.6 % Final    MCV 04/16/2024 92  79 - 97 fL Final    MCH 04/16/2024 29.4  26.6 - 33.0 pg Final    MCHC 04/16/2024 31.9  31.5 - 35.7 g/dL Final    RDW 04/16/2024 12.9  11.7 - 15.4 % Final    Platelets 04/16/2024 357  150 - 450 x10E3/uL Final    Neutrophil Rel % 04/16/2024 61  Not Estab. % Final    Lymphocyte Rel % 04/16/2024 25  Not Estab. % Final    Monocyte Rel % 04/16/2024 10  Not Estab. % Final    Eosinophil Rel % 04/16/2024 3  Not Estab. % Final    Basophil Rel % 04/16/2024 1  Not Estab. % Final    Neutrophils Absolute 04/16/2024 6.3  1.4 - 7.0 x10E3/uL Final    Lymphocytes Absolute 04/16/2024 2.5  0.7 - 3.1 x10E3/uL Final    Monocytes Absolute 04/16/2024 1.0 (H)  0.1 - 0.9 x10E3/uL Final    Eosinophils Absolute 04/16/2024 0.3  0.0 - 0.4 x10E3/uL Final    Basophils Absolute 04/16/2024 0.1  0.0 - 0.2 x10E3/uL Final    Immature Granulocyte Rel % 04/16/2024 0  Not Estab. % Final    Immature Grans Absolute 04/16/2024 0.0  0.0 - 0.1 x10E3/uL Final    Glucose 04/16/2024 90  70 - 99 mg/dL Final    BUN 04/16/2024 14  8 - 27 mg/dL Final    Creatinine  04/16/2024 0.74  0.57 - 1.00 mg/dL Final    EGFR Result 04/16/2024 91  >59 mL/min/1.73 Final    BUN/Creatinine Ratio 04/16/2024 19  12 - 28 Final    Sodium 04/16/2024 140  134 - 144 mmol/L Final    Potassium 04/16/2024 4.1  3.5 - 5.2 mmol/L Final    Chloride 04/16/2024 98  96 - 106 mmol/L Final    Total CO2 04/16/2024 28  20 - 29 mmol/L Final    Calcium 04/16/2024 9.5  8.7 - 10.3 mg/dL Final    Total Protein 04/16/2024 6.5  6.0 - 8.5 g/dL Final    Albumin 04/16/2024 3.8 (L)  3.9 - 4.9 g/dL Final    Globulin 04/16/2024 2.7  1.5 - 4.5 g/dL Final    A/G Ratio 04/16/2024 1.4  1.2 - 2.2 Final    Total Bilirubin 04/16/2024 0.3  0.0 - 1.2 mg/dL Final    Alkaline Phosphatase 04/16/2024 92  44 - 121 IU/L Final    AST (SGOT) 04/16/2024 6  0 - 40 IU/L Final    ALT (SGPT) 04/16/2024 12  0 - 32 IU/L Final    Vitamin B-12 04/16/2024 559  232 - 1,245 pg/mL Final    25 Hydroxy, Vitamin D 04/16/2024 54.3  30.0 - 100.0 ng/mL Final    Comment: Vitamin D deficiency has been defined by the [place] [employer]  [employer] and an Endocrine Society practice guideline as a  level of serum 25-OH vitamin D less than 20 ng/mL (1,2).  The Endocrine Society went on to further define vitamin D  insufficiency as a level between 21 and 29 ng/mL (2).  1. IOM ([place] [employer]). 2010. Dietary reference     intakes for calcium and D. [place]: The     National Academies Press.  2. Mu MF, Austin CLARK, Jennifer SERVIN, et al.     Evaluation, treatment, and prevention of vitamin D     deficiency: an Endocrine Society clinical practice     guideline. JCEM. 2011 Jul; 96(7):1911-30.      TIBC 04/16/2024 300  250 - 450 ug/dL Final    UIBC 04/16/2024 190  118 - 369 ug/dL Final    Iron 04/16/2024 110  27 - 139 ug/dL Final    Iron Saturation 04/16/2024 37  15 - 55 % Final    Ferritin 04/16/2024 62  15 - 150 ng/mL Final    TSH 04/16/2024 3.000  0.450 - 4.500 uIU/mL Final    Hemoglobin A1C 04/16/2024 6.1 (H)  4.8 - 5.6 % Final    Comment:           Prediabetes: 5.7 - 6.4           Diabetes: >6.4           Glycemic control for adults with diabetes: <7.0      Total Cholesterol 04/16/2024 187  100 - 199 mg/dL Final    Triglycerides 04/16/2024 157 (H)  0 - 149 mg/dL Final    HDL Cholesterol 04/16/2024 63  >39 mg/dL Final    VLDL Cholesterol Pancho 04/16/2024 27  5 - 40 mg/dL Final    LDL Chol Calc (NIH) 04/16/2024 97  0 - 99 mg/dL Final    LDL/HDL RATIO 04/16/2024 1.5  0.0 - 3.2 ratio Final    Comment:                                     LDL/HDL Ratio                                              Men  Women                                1/2 Avg.Risk  1.0    1.5                                    Avg.Risk  3.6    3.2                                 2X Avg.Risk  6.2    5.0                                 3X Avg.Risk  8.0    6.1      Magnesium 04/16/2024 1.9  1.6 - 2.3 mg/dL Final    Vitamin B1, Whole Blood 04/16/2024 150.1  66.5 - 200.0 nmol/L Final         Physical Exam  Constitutional:       General: She is not in acute distress.     Appearance: Normal appearance. She is not ill-appearing.   HENT:      Head: Normocephalic and atraumatic.      Right Ear: Tympanic membrane and ear canal normal.      Left Ear: Tympanic membrane and ear canal normal.      Nose: Nose normal.      Mouth/Throat:      Mouth: Mucous membranes are moist.      Pharynx: No oropharyngeal exudate or posterior oropharyngeal erythema.   Eyes:      General:         Right eye: No discharge.         Left eye: No discharge.      Extraocular Movements: Extraocular movements intact.      Conjunctiva/sclera: Conjunctivae normal.   Cardiovascular:      Rate and Rhythm: Normal rate and regular rhythm.      Pulses: Normal pulses.      Heart sounds: Normal heart sounds. No murmur heard.     No friction rub. No gallop.   Pulmonary:      Effort: Pulmonary effort is normal. No respiratory distress.      Breath sounds: Normal breath sounds. No stridor. No wheezing, rhonchi or rales.   Abdominal:      General:  Bowel sounds are normal. There is no distension.      Palpations: Abdomen is soft. There is no mass.      Tenderness: There is no abdominal tenderness. There is no right CVA tenderness, left CVA tenderness, guarding or rebound.      Hernia: No hernia is present.   Musculoskeletal:         General: Normal range of motion.      Cervical back: Normal range of motion and neck supple.   Skin:     General: Skin is warm and dry.      Findings: No rash.   Neurological:      General: No focal deficit present.      Mental Status: She is alert.      Motor: No weakness.   Psychiatric:         Mood and Affect: Mood normal.         Behavior: Behavior normal.         Thought Content: Thought content normal.         Judgment: Judgment normal.          Result Review  Data Reviewed:{ Labs  Result Review  Imaging  Med Tab  Media :23}   I have reviewed this patient's chart.  I have reviewed previous labs, previous imaging, previous medications, and previous encounters with notes that were available in this patient's chart.               Assessment and Plan {CC Problem List  Visit Diagnosis  ROS  Review (Popup)  [facility]  Quality  BestPractice  Medications  SmartSets  SnapShot Encounters  Media :23}   Diagnoses and all orders for this visit:    1. Annual physical exam (Primary)    2. Primary hypertension  Comments:  145/83. No assocaited symptoms. Continue Chlorthalidone 12.5 mg daily for now. She will return in 1 week for labs and will do bp check then.    3. Hypothyroidism, unspecified type  Comments:  Repeat labs. Continue levothyroxine 75 mcg daily for now.  Orders:  -     TSH Rfx On Abnormal To Free T4; Future    4. Other migraine without status migrainosus, not intractable  Comments:  Continue Exedrin and Zomig for acute treatment of migraines. Migraines around 3 times per month at this time.    5. Pre-diabetes  Comments:  Patient prefers no medication if possible. Hemoglobin A1c=6.1 in April 2024. Repeat  hemoglobin A1c. Discussed appropriate diet and exercise.  Orders:  -     Hemoglobin A1c    6. Colon cancer screening  Comments:  Last colonoscopy in 2017 recommended 5 year follow up. Will refer back where she was seen last.  Orders:  -     Ambulatory Referral For Screening Colonoscopy    7. Encounter for screening mammogram for malignant neoplasm of breast  Comments:  Last mammogram in 2022 recommended follow up for annual screening mammogram. Ordering today  Orders:  -     Mammo Screening Digital Tomosynthesis Bilateral With CAD; Future    8. H/O total hysterectomy  Comments:  Patient takes estradiol. D/c cervical cancer screening.    9. BMI 27.0-27.9,adult    10. Dietary counseling    11. Exercise counseling        -ER red flags discussed with patient including risk versus benefit and education provided.  -Follow-up with me      Follow Up {Instructions Charge Capture  Follow-up Communications :23}     Patient was given instructions and counseling regarding her condition or for health maintenance advice. Please see specific information pulled into the AVS (placed there by myself) if appropriate.    Return in about 1 week (around 10/7/2024) for Nurse visit for lab draw and blood pressure check please .      Karlene Yen PA-C

## 2024-09-30 NOTE — PROGRESS NOTES
September 30, 2024    Hello, may I speak with Marion Ro?    My name is CEDRIC      I am  with [facility]  [address] [facility] [place] [address].    Before we get started may I verify your date of birth? 1960    I am calling to officially welcome you to our practice and ask about your recent visit. Is this a good time to talk? yes    Tell me about your visit with us. What things went well?  PATIENT STATES VISIT WAS WONDERFUL REALLY DO LIKE HER SHE IS NICE        We're always looking for ways to make our patients' experiences even better. Do you have recommendations on ways we may improve?  no    Overall were you satisfied with your first visit to our practice? yes       I appreciate you taking the time to speak with me today. Is there anything else I can do for you? no      Thank you, and have a great day.

## 2024-10-07 ENCOUNTER — CLINICAL SUPPORT (OUTPATIENT)
Dept: FAMILY MEDICINE CLINIC | Facility: CLINIC | Age: 64
End: 2024-10-07
Payer: COMMERCIAL

## 2024-10-07 DIAGNOSIS — E03.9 HYPOTHYROIDISM, UNSPECIFIED TYPE: ICD-10-CM

## 2024-10-07 LAB — TSH SERPL DL<=0.05 MIU/L-ACNC: 1.46 UIU/ML (ref 0.27–4.2)

## 2024-10-07 PROCEDURE — 84443 ASSAY THYROID STIM HORMONE: CPT

## 2024-10-07 PROCEDURE — 83036 HEMOGLOBIN GLYCOSYLATED A1C: CPT

## 2024-10-07 PROCEDURE — 36415 COLL VENOUS BLD VENIPUNCTURE: CPT | Performed by: REGISTERED NURSE

## 2024-10-07 NOTE — PROGRESS NOTES
Venipuncture Blood Specimen Collection  Venipuncture performed in RT ARM by Andrei Wilson with good hemostasis. Patient tolerated the procedure well without complications.   10/07/24   Andrei Wilson     Estimated Blood Loss (Cc): minimal

## 2024-10-08 LAB — HBA1C MFR BLD: 5.7 % (ref 4.8–5.6)

## 2024-10-14 DIAGNOSIS — E03.9 ACQUIRED HYPOTHYROIDISM: ICD-10-CM

## 2024-10-14 RX ORDER — CHLORTHALIDONE 25 MG/1
12.5 TABLET ORAL EVERY MORNING
Qty: 45 TABLET | Refills: 3 | Status: SHIPPED | OUTPATIENT
Start: 2024-10-14

## 2024-10-14 RX ORDER — LEVOTHYROXINE SODIUM 75 UG/1
75 TABLET ORAL DAILY
Qty: 90 TABLET | Refills: 1 | Status: SHIPPED | OUTPATIENT
Start: 2024-10-14

## 2024-10-14 RX ORDER — ZOLMITRIPTAN 5 MG/1
5 TABLET, FILM COATED ORAL AS NEEDED
Qty: 12 TABLET | Refills: 5 | Status: SHIPPED | OUTPATIENT
Start: 2024-10-14

## 2024-10-14 RX ORDER — ESTRADIOL 1 MG/1
1 TABLET ORAL DAILY
Qty: 90 TABLET | Refills: 0 | Status: SHIPPED | OUTPATIENT
Start: 2024-10-14

## 2024-10-14 NOTE — TELEPHONE ENCOUNTER
Rx Refill Note  Requested Prescriptions     Pending Prescriptions Disp Refills    ZOLMitriptan (ZOMIG) 5 MG tablet 12 tablet 5     Sig: Take 1 tablet by mouth As Needed for Migraine. for up to one dose.    estradiol (ESTRACE) 1 MG tablet 90 tablet 0     Sig: Take 1 tablet by mouth Daily.    chlorthalidone (HYGROTON) 25 MG tablet 45 tablet 3     Sig: Take 0.5 tablets by mouth Every Morning.    levothyroxine (SYNTHROID, LEVOTHROID) 75 MCG tablet 90 tablet 1     Sig: Take 1 tablet by mouth Daily. No further refills until seen in office with fasting blood work      Last office visit with prescribing clinician: 9/30/2024   Last telemedicine visit with prescribing clinician: Visit date not found   Next office visit with prescribing clinician: Visit date not found                         Would you like a call back once the refill request has been completed: [] Yes [] No    If the office needs to give you a call back, can they leave a voicemail: [] Yes [] No    Ngoc Ellis MA  10/14/24, 08:12 EDT

## 2024-12-05 NOTE — TELEPHONE ENCOUNTER
Let patient know    68-year-old male history of asthma JASON on CPAP nocturnal, hypertension hyperlipidemia atrial fibrillation (recent transition from Xarelto to Eliquis 2/2 insurance coverage), insulin-dependent diabetic, liver cancer status post resection, kidney cancer status post right nephrectomy, hypothyroidism, osteoarthritis presents with hyperglycemia, polyuria and polydipsia. Patient believes this is secondary to his Eliquis. No recent illness. ED record reviewed, no evidence of DKA. Patient placed in observation for endocrine consult

## 2024-12-09 ENCOUNTER — TELEPHONE (OUTPATIENT)
Dept: FAMILY MEDICINE CLINIC | Facility: CLINIC | Age: 64
End: 2024-12-09
Payer: COMMERCIAL

## 2024-12-09 DIAGNOSIS — Z86.2 HISTORY OF ANEMIA: Primary | ICD-10-CM

## 2024-12-09 NOTE — TELEPHONE ENCOUNTER
Caller: Marion Ro    Relationship: Self    Best call back number:   Telephone Information:   Mobile [phone]       What orders are you requesting (i.e. lab or imaging): WANTING TO CHECK IF HER STUFF IS LOW      In what timeframe would the patient need to come in: ANY    Where will you receive your lab/imaging services: [facility] OFFICE    Additional notes: PATIENT RECENTLY HAD SURGERY AND IS REQUESTING TO GET HER IRON AND OTHER LEVELS CHECKED TO SEE IF THEY'RE LOW.

## 2024-12-11 NOTE — TELEPHONE ENCOUNTER
CALLED AND SPOKE WITH PT FOR A LAB APPOINTMENT AT THIS TIME PT DENIED APPOINTMENT SHE STATED THAT SHE CALL HER THE OFFICE THAT DID SURGERY THEY TOLD HER GIVE IT COUPLE DAYS MAYBE DUE TO ANESTHESIA

## 2025-01-10 RX ORDER — ESTRADIOL 1 MG/1
1 TABLET ORAL DAILY
Qty: 90 TABLET | Refills: 0 | Status: SHIPPED | OUTPATIENT
Start: 2025-01-10

## 2025-01-10 NOTE — TELEPHONE ENCOUNTER
Rx Refill Note  Requested Prescriptions     Pending Prescriptions Disp Refills    estradiol (ESTRACE) 1 MG tablet [Pharmacy Med Name: ESTRADIOL 1 MG TABLET] 90 tablet 0     Sig: TAKE 1 TABLET BY MOUTH EVERY DAY      Last office visit with prescribing clinician: 9/30/2024   Last telemedicine visit with prescribing clinician: Visit date not found   Next office visit with prescribing clinician: Visit date not found                         Would you like a call back once the refill request has been completed: [] Yes [] No    If the office needs to give you a call back, can they leave a voicemail: [] Yes [] No    Ngoc Ellis MA  01/10/25, 08:16 EST

## 2025-04-28 ENCOUNTER — OFFICE VISIT (OUTPATIENT)
Dept: FAMILY MEDICINE CLINIC | Facility: CLINIC | Age: 65
End: 2025-04-28
Payer: COMMERCIAL

## 2025-04-28 VITALS
DIASTOLIC BLOOD PRESSURE: 95 MMHG | SYSTOLIC BLOOD PRESSURE: 148 MMHG | TEMPERATURE: 96.4 F | OXYGEN SATURATION: 99 % | HEIGHT: 62 IN | BODY MASS INDEX: 27.09 KG/M2 | RESPIRATION RATE: 14 BRPM | WEIGHT: 147.2 LBS | HEART RATE: 80 BPM

## 2025-04-28 DIAGNOSIS — E03.9 ACQUIRED HYPOTHYROIDISM: ICD-10-CM

## 2025-04-28 DIAGNOSIS — Z90.710 H/O TOTAL HYSTERECTOMY: ICD-10-CM

## 2025-04-28 DIAGNOSIS — R73.03 PRE-DIABETES: ICD-10-CM

## 2025-04-28 DIAGNOSIS — I10 PRIMARY HYPERTENSION: ICD-10-CM

## 2025-04-28 DIAGNOSIS — E55.9 VITAMIN D DEFICIENCY: ICD-10-CM

## 2025-04-28 DIAGNOSIS — E78.00 PURE HYPERCHOLESTEROLEMIA: ICD-10-CM

## 2025-04-28 DIAGNOSIS — G43.809 OTHER MIGRAINE WITHOUT STATUS MIGRAINOSUS, NOT INTRACTABLE: ICD-10-CM

## 2025-04-28 DIAGNOSIS — Z86.2 HISTORY OF ANEMIA: Primary | ICD-10-CM

## 2025-04-28 DIAGNOSIS — K59.09 CHRONIC CONSTIPATION: ICD-10-CM

## 2025-04-28 LAB — TSH SERPL DL<=0.05 MIU/L-ACNC: 1.76 UIU/ML (ref 0.27–4.2)

## 2025-04-28 PROCEDURE — 82306 VITAMIN D 25 HYDROXY: CPT

## 2025-04-28 PROCEDURE — 80061 LIPID PANEL: CPT

## 2025-04-28 PROCEDURE — 83036 HEMOGLOBIN GLYCOSYLATED A1C: CPT

## 2025-04-28 PROCEDURE — 80050 GENERAL HEALTH PANEL: CPT

## 2025-04-28 RX ORDER — ESTRADIOL 1 MG/1
1 TABLET ORAL DAILY
Qty: 90 TABLET | Refills: 0 | Status: SHIPPED | OUTPATIENT
Start: 2025-04-28

## 2025-04-28 RX ORDER — LEVOTHYROXINE SODIUM 75 UG/1
75 TABLET ORAL DAILY
Qty: 90 TABLET | Refills: 0 | Status: SHIPPED | OUTPATIENT
Start: 2025-04-28

## 2025-04-28 RX ORDER — CHLORTHALIDONE 25 MG/1
12.5 TABLET ORAL EVERY MORNING
Qty: 45 TABLET | Refills: 1 | Status: SHIPPED | OUTPATIENT
Start: 2025-04-28

## 2025-04-28 NOTE — ASSESSMENT & PLAN NOTE
Orders:    TSH Rfx On Abnormal To Free T4; Future    levothyroxine (SYNTHROID, LEVOTHROID) 75 MCG tablet; Take 1 tablet by mouth Daily. No further refills until seen in office with fasting blood work

## 2025-04-28 NOTE — PROGRESS NOTES
Chief Complaint  Hyperlipidemia (6 month f/u med refill and lab draw.)    Subjective    History of Present Illness {CC  Problem List  Visit  Diagnosis   Encounters  Notes  Medications  Labs  Result Review Imaging  Media :23}     Marion Ro presents to [facility] for Hyperlipidemia (6 month f/u med refill and lab draw.).      History of Present Illness     History of Present Illness  The patient is a 64-year-old individual who presents to follow up on chronic conditions, including hypertension, hypothyroidism, migraines, and prediabetes.    They report feeling lethargic this morning, accompanied by a mild headache due to neck stiffness. Symptoms have been managed with a massager, providing significant relief. They plan to take Excedrin after consuming some food. They are uncertain about the need for a refill of Zomig but always ensure some is available. Typically, they halve the tablet, which effectively alleviates their migraines. The frequency of their migraines has decreased compared to the past.    Blood pressure is elevated today, attributed to not having taken their medication. Medication is usually administered early in the day. Regular monitoring of blood pressure at home is not performed, but they intend to start using their spouse's new blood pressure cuff. They have never been prescribed a higher dose or a second medication for blood pressure. Currently, they are on a regimen of half a tablet of Chlorthalidone 25 mg.    In 2014, a partial knee replacement was performed, and they started experiencing some swelling in it. Meloxicam was prescribed, which worked well but raised their blood pressure, necessitating discontinuation.    An ENT specialist was seen earlier this month for ear wax removal. A significant amount of wax was removed a couple of years ago, and it was about half as much this time.    Interest in undergoing laboratory tests today to assess  "overall health status is expressed. Levothyroxine dosage remains unchanged.      BP Readings from Last 3 Encounters:   04/28/25 148/95   09/30/24 145/83   04/16/24 143/89             Objective     Vital Signs:   /95 Comment: no BP meds today  Pulse 80   Temp 96.4 °F (35.8 °C) (Temporal)   Resp 14   Ht 157.5 cm (62\")   Wt 66.8 kg (147 lb 3.2 oz)   SpO2 99%   BMI 26.92 kg/m²   Current Outpatient Medications on File Prior to Visit   Medication Sig Dispense Refill    Potassium 99 MG tablet Take 1 tablet by mouth Daily.      Probiotic Product (PROBIOTIC DAILY PO) Take 1 capsule by mouth Daily.      vitamin D (ERGOCALCIFEROL) 1.25 MG (48326 UT) capsule capsule TAKE 1 CAPSULE BY MOUTH ONE TIME PER WEEK 12 capsule 3    ZOLMitriptan (ZOMIG) 5 MG tablet Take 1 tablet by mouth As Needed for Migraine. for up to one dose. 12 tablet 5    [DISCONTINUED] chlorthalidone (HYGROTON) 25 MG tablet Take 0.5 tablets by mouth Every Morning. 45 tablet 3    [DISCONTINUED] estradiol (ESTRACE) 1 MG tablet TAKE 1 TABLET BY MOUTH EVERY DAY 90 tablet 0    [DISCONTINUED] levothyroxine (SYNTHROID, LEVOTHROID) 75 MCG tablet Take 1 tablet by mouth Daily. No further refills until seen in office with fasting blood work 90 tablet 1    [DISCONTINUED] linaclotide (Linzess) 72 MCG capsule capsule TAKE 1 CAPSULE BY MOUTH EVERY DAY IN THE MORNING BEFORE BREAKFAST 90 capsule 3     No current facility-administered medications on file prior to visit.        Past Medical History:   Diagnosis Date    Adult general medical examination 04/04/2017    Colon polyp 2017    One small    Encounter for hepatitis C screening test for low risk patient 06/30/2023    General symptom 08/04/2015    Headache     Zolmatriptan as needed    Hypertension     Hypothyroidism     Impacted cerumen of right ear 02/19/2017    Other screening mammogram 01/23/2017    Serous otitis media 02/16/2017    Urinary tract infection 12/12/2018      Past Surgical History:   Procedure " "Laterality Date    BILATERAL BREAST REDUCTION      BLADDER REPAIR  2024    2 bladder \"tack\" procedures    BONY PELVIS SURGERY      BREAST SURGERY  2014    Reduction    COLONOSCOPY  2017    polyps- negative    HYSTERECTOMY      JOINT REPLACEMENT  2014    Partial right knee    KNEE ARTHROPLASTY, PARTIAL REPLACEMENT Right     THYROID CYST EXCISION      TUBAL ABDOMINAL LIGATION   ?      Family History   Problem Relation Age of Onset    Lung cancer Mother     Arthritis Mother     Cancer Mother          of cancer    COPD Mother     Depression Mother     Heart disease Mother     Hyperlipidemia Mother     Hypertension Mother     Stroke Mother     Alcohol abuse Father     COPD Brother     Heart block Brother     COPD Brother     Heart block Brother     No Known Problems Daughter     No Known Problems Son     No Known Problems Maternal Grandmother     Alzheimer's disease Maternal Grandfather     Dementia Maternal Grandfather       Social History     Socioeconomic History    Marital status:    Tobacco Use    Smoking status: Never     Passive exposure: Never    Smokeless tobacco: Never   Vaping Use    Vaping status: Never Used   Substance and Sexual Activity    Alcohol use: Never    Drug use: Never    Sexual activity: Yes     Partners: Male     Birth control/protection: Tubal ligation, Hysterectomy, Surgical         No visits with results within 3 Month(s) from this visit.   Latest known visit with results is:   Clinical Support on 10/07/2024   Component Date Value Ref Range Status    TSH 10/07/2024 1.460  0.270 - 4.200 uIU/mL Final         Physical Exam  Constitutional:       Appearance: Normal appearance.   HENT:      Head: Normocephalic and atraumatic.   Eyes:      General: No scleral icterus.     Extraocular Movements: Extraocular movements intact.   Cardiovascular:      Rate and Rhythm: Normal rate and regular rhythm.      Pulses: Normal pulses.      Heart sounds: Normal heart sounds. No murmur " heard.     No friction rub. No gallop.   Pulmonary:      Effort: Pulmonary effort is normal.      Breath sounds: Normal breath sounds. No wheezing, rhonchi or rales.   Abdominal:      General: Abdomen is flat. Bowel sounds are normal.      Palpations: Abdomen is soft.      Tenderness: There is no abdominal tenderness. There is no right CVA tenderness or left CVA tenderness.   Musculoskeletal:      Cervical back: Neck supple.   Skin:     General: Skin is warm and dry.   Neurological:      Mental Status: She is alert. Mental status is at baseline.      Coordination: Coordination normal.      Gait: Gait normal.   Psychiatric:         Mood and Affect: Mood normal.         Behavior: Behavior normal.         Thought Content: Thought content normal.         Judgment: Judgment normal.          Result Review  Data Reviewed:{ Labs  Result Review  Imaging  Med Tab  Media :23}   I have reviewed this patient's chart.  I have reviewed previous labs, previous imaging, previous medications, and previous encounters with notes that were available in this patient's chart.               Assessment and Plan {CC Problem List  Visit Diagnosis  ROS  Review (Popup)  [facility] Maintenance  Quality  BestPractice  Medications  SmartSets  SnapShot Encounters  Media :23}      History of anemia    Orders:    CBC & Differential    Acquired hypothyroidism    Orders:    TSH Rfx On Abnormal To Free T4; Future    levothyroxine (SYNTHROID, LEVOTHROID) 75 MCG tablet; Take 1 tablet by mouth Daily. No further refills until seen in office with fasting blood work    Primary hypertension      Orders:    Comprehensive Metabolic Panel    chlorthalidone (HYGROTON) 25 MG tablet; Take 0.5 tablets by mouth Every Morning.    Pre-diabetes    Orders:    Hemoglobin A1c    Other migraine without status migrainosus, not intractable                   H/O total hysterectomy    Orders:    estradiol (ESTRACE) 1 MG tablet; Take 1 tablet by mouth  Daily.    Vitamin D deficiency    Orders:    Vitamin D 25 hydroxy; Future    Pure hypercholesterolemia       Orders:    Lipid Panel    Chronic constipation    Orders:    linaclotide (Linzess) 72 MCG capsule capsule; Take 1 capsule by mouth Every Morning Before Breakfast.         Assessment & Plan  1. Hypertension  -Elevated in office today and last time. Recommend increase medication but patient wants to check at home first.  - Currently taking half of a 25 mg tablet.  - Advised to monitor blood pressure at home.  - If average readings exceed 130/80, increase dosage to one full 25 mg tablet and inform the office for a new prescription.    2. Hypothyroidism:  - Currently on levothyroxine 75 mcg with no changes in symptoms or medication regimen.  - Lab order placed to check thyroid levels.    3. Migraines:  - Experiences migraines and uses Zomig (zolmitriptan) as needed, often taking half a tablet to manage symptoms.  - Prescription for Zomig issued.    4. Prediabetes:  - Lab order placed to monitor blood sugar levels.    5. Post-surgical follow-up:  - Had laparoscopic uterosacral ligament suspension with sling and repair in 11/2024.  - No new issues reported related to the surgery.    6. HLD  -New fasting lipid panel      -ER red flags discussed with patient including risk versus benefit and education provided.  -Follow-up with me      Follow Up {Instructions Charge Capture  Follow-up Communications :23}     Patient was given instructions and counseling regarding her condition or for health maintenance advice. Please see specific information pulled into the AVS (placed there by myself) if appropriate.    Return in about 3 months (around 7/28/2025) for follow up chronic conditions .      Karlene Yen PA-C      Patient or patient representative verbalized consent for the use of Ambient Listening during the visit with  Karlene Yen PA-C for chart documentation. 4/28/2025  09:27 EDT

## 2025-04-28 NOTE — ASSESSMENT & PLAN NOTE
Orders:    Comprehensive Metabolic Panel    chlorthalidone (HYGROTON) 25 MG tablet; Take 0.5 tablets by mouth Every Morning.

## 2025-04-29 LAB
25(OH)D3 SERPL-MCNC: 44 NG/ML (ref 30–100)
ALBUMIN SERPL-MCNC: 4.2 G/DL (ref 3.5–5.2)
ALBUMIN/GLOB SERPL: 1.4 G/DL
ALP SERPL-CCNC: 105 U/L (ref 39–117)
ALT SERPL W P-5'-P-CCNC: 10 U/L (ref 1–33)
ANION GAP SERPL CALCULATED.3IONS-SCNC: 10 MMOL/L (ref 5–15)
AST SERPL-CCNC: 12 U/L (ref 1–32)
BASOPHILS # BLD AUTO: 0.08 10*3/MM3 (ref 0–0.2)
BASOPHILS NFR BLD AUTO: 1.2 % (ref 0–1.5)
BILIRUB SERPL-MCNC: 0.3 MG/DL (ref 0–1.2)
BUN SERPL-MCNC: 10 MG/DL (ref 8–23)
BUN/CREAT SERPL: 17.9 (ref 7–25)
CALCIUM SPEC-SCNC: 9.2 MG/DL (ref 8.6–10.5)
CHLORIDE SERPL-SCNC: 99 MMOL/L (ref 98–107)
CHOLEST SERPL-MCNC: 175 MG/DL (ref 0–200)
CO2 SERPL-SCNC: 28 MMOL/L (ref 22–29)
CREAT SERPL-MCNC: 0.56 MG/DL (ref 0.57–1)
DEPRECATED RDW RBC AUTO: 44.9 FL (ref 37–54)
EGFRCR SERPLBLD CKD-EPI 2021: 102.7 ML/MIN/1.73
EOSINOPHIL # BLD AUTO: 0.19 10*3/MM3 (ref 0–0.4)
EOSINOPHIL NFR BLD AUTO: 2.8 % (ref 0.3–6.2)
ERYTHROCYTE [DISTWIDTH] IN BLOOD BY AUTOMATED COUNT: 13.5 % (ref 12.3–15.4)
GLOBULIN UR ELPH-MCNC: 3 GM/DL
GLUCOSE SERPL-MCNC: 92 MG/DL (ref 65–99)
HBA1C MFR BLD: 6.2 % (ref 4.8–5.6)
HCT VFR BLD AUTO: 43 % (ref 34–46.6)
HDLC SERPL-MCNC: 59 MG/DL (ref 40–60)
HGB BLD-MCNC: 13.4 G/DL (ref 12–15.9)
IMM GRANULOCYTES # BLD AUTO: 0.02 10*3/MM3 (ref 0–0.05)
IMM GRANULOCYTES NFR BLD AUTO: 0.3 % (ref 0–0.5)
LDLC SERPL CALC-MCNC: 98 MG/DL (ref 0–100)
LDLC/HDLC SERPL: 1.63 {RATIO}
LYMPHOCYTES # BLD AUTO: 1.23 10*3/MM3 (ref 0.7–3.1)
LYMPHOCYTES NFR BLD AUTO: 17.8 % (ref 19.6–45.3)
MCH RBC QN AUTO: 28.6 PG (ref 26.6–33)
MCHC RBC AUTO-ENTMCNC: 31.2 G/DL (ref 31.5–35.7)
MCV RBC AUTO: 91.7 FL (ref 79–97)
MONOCYTES # BLD AUTO: 0.7 10*3/MM3 (ref 0.1–0.9)
MONOCYTES NFR BLD AUTO: 10.1 % (ref 5–12)
NEUTROPHILS NFR BLD AUTO: 4.68 10*3/MM3 (ref 1.7–7)
NEUTROPHILS NFR BLD AUTO: 67.8 % (ref 42.7–76)
NRBC BLD AUTO-RTO: 0 /100 WBC (ref 0–0.2)
PLATELET # BLD AUTO: 299 10*3/MM3 (ref 140–450)
PMV BLD AUTO: 12.3 FL (ref 6–12)
POTASSIUM SERPL-SCNC: 3.6 MMOL/L (ref 3.5–5.2)
PROT SERPL-MCNC: 7.2 G/DL (ref 6–8.5)
RBC # BLD AUTO: 4.69 10*6/MM3 (ref 3.77–5.28)
SODIUM SERPL-SCNC: 137 MMOL/L (ref 136–145)
TRIGL SERPL-MCNC: 99 MG/DL (ref 0–150)
VLDLC SERPL-MCNC: 18 MG/DL (ref 5–40)
WBC NRBC COR # BLD AUTO: 6.9 10*3/MM3 (ref 3.4–10.8)

## 2025-05-21 ENCOUNTER — TELEPHONE (OUTPATIENT)
Dept: FAMILY MEDICINE CLINIC | Facility: CLINIC | Age: 65
End: 2025-05-21
Payer: COMMERCIAL

## 2025-05-21 NOTE — TELEPHONE ENCOUNTER
Caller: Marion Ro    Relationship: Self    Best call back number: [phone]     Who is your current provider: NAHOMI LAMA    Is your current provider offboarding? NO    Who would you like your new provider to be: LUIS WALDRON    What are your reasons for transferring care:  SEES LUIS SO PATIENT WOULD ALSO LIKE TO SWITCH TO [facility].    Additional notes: PLEASE CALL AND ADVISE.

## 2025-07-03 RX ORDER — ZOLMITRIPTAN 5 MG/1
5 TABLET, FILM COATED ORAL AS NEEDED
Qty: 12 TABLET | Refills: 5 | Status: SHIPPED | OUTPATIENT
Start: 2025-07-03

## 2025-07-22 ENCOUNTER — OFFICE VISIT (OUTPATIENT)
Dept: FAMILY MEDICINE CLINIC | Facility: CLINIC | Age: 65
End: 2025-07-22
Payer: COMMERCIAL

## 2025-07-22 VITALS
HEART RATE: 82 BPM | WEIGHT: 148.2 LBS | HEIGHT: 62 IN | SYSTOLIC BLOOD PRESSURE: 145 MMHG | OXYGEN SATURATION: 97 % | TEMPERATURE: 97.3 F | DIASTOLIC BLOOD PRESSURE: 92 MMHG | RESPIRATION RATE: 19 BRPM | BODY MASS INDEX: 27.27 KG/M2

## 2025-07-22 DIAGNOSIS — R10.12 LEFT UPPER QUADRANT ABDOMINAL PAIN: ICD-10-CM

## 2025-07-22 DIAGNOSIS — R73.9 HYPERGLYCEMIA: ICD-10-CM

## 2025-07-22 DIAGNOSIS — Z13.0 SCREENING FOR ENDOCRINE, METABOLIC AND IMMUNITY DISORDER: ICD-10-CM

## 2025-07-22 DIAGNOSIS — E11.65 TYPE 2 DIABETES MELLITUS WITH HYPERGLYCEMIA, WITHOUT LONG-TERM CURRENT USE OF INSULIN: ICD-10-CM

## 2025-07-22 DIAGNOSIS — Z12.11 ENCOUNTER FOR SCREENING FOR MALIGNANT NEOPLASM OF COLON: ICD-10-CM

## 2025-07-22 DIAGNOSIS — Z12.31 ENCOUNTER FOR SCREENING MAMMOGRAM FOR MALIGNANT NEOPLASM OF BREAST: ICD-10-CM

## 2025-07-22 DIAGNOSIS — K59.09 CHRONIC CONSTIPATION: ICD-10-CM

## 2025-07-22 DIAGNOSIS — Z76.89 ENCOUNTER TO ESTABLISH CARE WITH NEW PROVIDER: Primary | ICD-10-CM

## 2025-07-22 DIAGNOSIS — Z13.228 SCREENING FOR ENDOCRINE, METABOLIC AND IMMUNITY DISORDER: ICD-10-CM

## 2025-07-22 DIAGNOSIS — R07.89 CHEST DISCOMFORT: ICD-10-CM

## 2025-07-22 DIAGNOSIS — Z13.29 SCREENING FOR ENDOCRINE, METABOLIC AND IMMUNITY DISORDER: ICD-10-CM

## 2025-07-22 DIAGNOSIS — E03.9 ACQUIRED HYPOTHYROIDISM: ICD-10-CM

## 2025-07-22 DIAGNOSIS — M25.50 ARTHRALGIA, UNSPECIFIED JOINT: ICD-10-CM

## 2025-07-22 DIAGNOSIS — N95.2 VAGINAL ATROPHY: ICD-10-CM

## 2025-07-22 DIAGNOSIS — I10 PRIMARY HYPERTENSION: ICD-10-CM

## 2025-07-22 DIAGNOSIS — Z90.710 H/O TOTAL HYSTERECTOMY: ICD-10-CM

## 2025-07-22 PROCEDURE — 99215 OFFICE O/P EST HI 40 MIN: CPT | Performed by: REGISTERED NURSE

## 2025-07-22 RX ORDER — MELOXICAM 7.5 MG/1
7.5 TABLET ORAL DAILY PRN
Qty: 30 TABLET | Refills: 1 | Status: SHIPPED | OUTPATIENT
Start: 2025-07-22

## 2025-07-22 RX ORDER — LEVOTHYROXINE SODIUM 75 UG/1
75 TABLET ORAL DAILY
Qty: 90 TABLET | Refills: 0 | Status: SHIPPED | OUTPATIENT
Start: 2025-07-22

## 2025-07-22 RX ORDER — ESTRADIOL 1 MG/1
1 TABLET ORAL DAILY
Qty: 90 TABLET | Refills: 0 | Status: SHIPPED | OUTPATIENT
Start: 2025-07-22 | End: 2025-08-18 | Stop reason: SDUPTHER

## 2025-07-25 DIAGNOSIS — Z90.710 H/O TOTAL HYSTERECTOMY: ICD-10-CM

## 2025-07-25 RX ORDER — ESTRADIOL 1 MG/1
1 TABLET ORAL DAILY
Qty: 90 TABLET | Refills: 0 | OUTPATIENT
Start: 2025-07-25

## 2025-07-25 NOTE — TELEPHONE ENCOUNTER
JAYLENE ON 07-22-25  NURSE VISIT ON 08-5  UPCOMING JAYLENE 10-28    Rx Refill Note  Requested Prescriptions     Pending Prescriptions Disp Refills    estradiol (ESTRACE) 1 MG tablet 90 tablet 0     Sig: Take 1 tablet by mouth Daily.      Last office visit with prescribing clinician: 7/22/2025   Last telemedicine visit with prescribing clinician: Visit date not found   Next office visit with prescribing clinician: 10/28/2025                         Would you like a call back once the refill request has been completed: [] Yes [] No    If the office needs to give you a call back, can they leave a voicemail: [] Yes [] No    Andrei Moise Rep  07/25/25, 08:27 EDT

## 2025-08-05 ENCOUNTER — CLINICAL SUPPORT (OUTPATIENT)
Dept: FAMILY MEDICINE CLINIC | Facility: CLINIC | Age: 65
End: 2025-08-05
Payer: COMMERCIAL

## 2025-08-05 DIAGNOSIS — Z13.0 SCREENING FOR ENDOCRINE, METABOLIC AND IMMUNITY DISORDER: ICD-10-CM

## 2025-08-05 DIAGNOSIS — R73.9 HYPERGLYCEMIA: ICD-10-CM

## 2025-08-05 DIAGNOSIS — Z13.228 SCREENING FOR ENDOCRINE, METABOLIC AND IMMUNITY DISORDER: ICD-10-CM

## 2025-08-05 DIAGNOSIS — Z86.2 HISTORY OF ANEMIA: ICD-10-CM

## 2025-08-05 DIAGNOSIS — Z13.29 SCREENING FOR ENDOCRINE, METABOLIC AND IMMUNITY DISORDER: ICD-10-CM

## 2025-08-05 LAB
BASOPHILS # BLD AUTO: 0.08 10*3/MM3 (ref 0–0.2)
BASOPHILS NFR BLD AUTO: 1.2 % (ref 0–1.5)
DEPRECATED RDW RBC AUTO: 39.6 FL (ref 37–54)
EOSINOPHIL # BLD AUTO: 0.35 10*3/MM3 (ref 0–0.4)
EOSINOPHIL NFR BLD AUTO: 5.2 % (ref 0.3–6.2)
ERYTHROCYTE [DISTWIDTH] IN BLOOD BY AUTOMATED COUNT: 12 % (ref 12.3–15.4)
HBA1C MFR BLD: 6.6 % (ref 4.8–5.6)
HCT VFR BLD AUTO: 43.6 % (ref 34–46.6)
HGB BLD-MCNC: 14.2 G/DL (ref 12–15.9)
IMM GRANULOCYTES # BLD AUTO: 0.01 10*3/MM3 (ref 0–0.05)
IMM GRANULOCYTES NFR BLD AUTO: 0.1 % (ref 0–0.5)
LYMPHOCYTES # BLD AUTO: 1.53 10*3/MM3 (ref 0.7–3.1)
LYMPHOCYTES NFR BLD AUTO: 22.6 % (ref 19.6–45.3)
MCH RBC QN AUTO: 29.7 PG (ref 26.6–33)
MCHC RBC AUTO-ENTMCNC: 32.6 G/DL (ref 31.5–35.7)
MCV RBC AUTO: 91.2 FL (ref 79–97)
MONOCYTES # BLD AUTO: 0.74 10*3/MM3 (ref 0.1–0.9)
MONOCYTES NFR BLD AUTO: 10.9 % (ref 5–12)
NEUTROPHILS NFR BLD AUTO: 4.05 10*3/MM3 (ref 1.7–7)
NEUTROPHILS NFR BLD AUTO: 60 % (ref 42.7–76)
NRBC BLD AUTO-RTO: 0 /100 WBC (ref 0–0.2)
PLATELET # BLD AUTO: 303 10*3/MM3 (ref 140–450)
PMV BLD AUTO: 12.4 FL (ref 6–12)
RBC # BLD AUTO: 4.78 10*6/MM3 (ref 3.77–5.28)
WBC NRBC COR # BLD AUTO: 6.76 10*3/MM3 (ref 3.4–10.8)

## 2025-08-05 PROCEDURE — 80053 COMPREHEN METABOLIC PANEL: CPT

## 2025-08-05 PROCEDURE — 85025 COMPLETE CBC W/AUTO DIFF WBC: CPT

## 2025-08-05 PROCEDURE — 83036 HEMOGLOBIN GLYCOSYLATED A1C: CPT | Performed by: REGISTERED NURSE

## 2025-08-05 PROCEDURE — 84466 ASSAY OF TRANSFERRIN: CPT

## 2025-08-05 PROCEDURE — 83540 ASSAY OF IRON: CPT

## 2025-08-06 LAB
ALBUMIN SERPL-MCNC: 3.9 G/DL (ref 3.5–5.2)
ALBUMIN/GLOB SERPL: 1.2 G/DL
ALP SERPL-CCNC: 102 U/L (ref 39–117)
ALT SERPL W P-5'-P-CCNC: 13 U/L (ref 1–33)
ANION GAP SERPL CALCULATED.3IONS-SCNC: 11.9 MMOL/L (ref 5–15)
AST SERPL-CCNC: 14 U/L (ref 1–32)
BILIRUB SERPL-MCNC: 0.3 MG/DL (ref 0–1.2)
BUN SERPL-MCNC: 14 MG/DL (ref 8–23)
BUN/CREAT SERPL: 20.3 (ref 7–25)
CALCIUM SPEC-SCNC: 9.8 MG/DL (ref 8.6–10.5)
CHLORIDE SERPL-SCNC: 102 MMOL/L (ref 98–107)
CO2 SERPL-SCNC: 25.1 MMOL/L (ref 22–29)
CREAT SERPL-MCNC: 0.69 MG/DL (ref 0.57–1)
EGFRCR SERPLBLD CKD-EPI 2021: 97.7 ML/MIN/1.73
GLOBULIN UR ELPH-MCNC: 3.2 GM/DL
GLUCOSE SERPL-MCNC: 138 MG/DL (ref 65–99)
IRON 24H UR-MRATE: 92 MCG/DL (ref 37–145)
IRON SATN MFR SERPL: 24 % (ref 20–50)
POTASSIUM SERPL-SCNC: 3.6 MMOL/L (ref 3.5–5.2)
PROT SERPL-MCNC: 7.1 G/DL (ref 6–8.5)
SODIUM SERPL-SCNC: 139 MMOL/L (ref 136–145)
TIBC SERPL-MCNC: 380 MCG/DL (ref 298–536)
TRANSFERRIN SERPL-MCNC: 255 MG/DL (ref 200–360)

## 2025-08-18 RX ORDER — ESTRADIOL 1 MG/1
1 TABLET ORAL DAILY
Qty: 90 TABLET | Refills: 0 | Status: SHIPPED | OUTPATIENT
Start: 2025-08-18